# Patient Record
Sex: MALE | Race: BLACK OR AFRICAN AMERICAN | NOT HISPANIC OR LATINO | Employment: FULL TIME | URBAN - METROPOLITAN AREA
[De-identification: names, ages, dates, MRNs, and addresses within clinical notes are randomized per-mention and may not be internally consistent; named-entity substitution may affect disease eponyms.]

---

## 2017-03-03 ENCOUNTER — APPOINTMENT (OUTPATIENT)
Dept: URGENT CARE | Age: 47
End: 2017-03-03
Payer: OTHER MISCELLANEOUS

## 2017-03-03 PROCEDURE — 99283 EMERGENCY DEPT VISIT LOW MDM: CPT

## 2017-03-03 PROCEDURE — G0382 LEV 3 HOSP TYPE B ED VISIT: HCPCS

## 2017-03-07 ENCOUNTER — APPOINTMENT (OUTPATIENT)
Dept: OCCUPATIONAL MEDICINE | Facility: CLINIC | Age: 47
End: 2017-03-07
Payer: OTHER MISCELLANEOUS

## 2017-03-07 PROCEDURE — 99213 OFFICE O/P EST LOW 20 MIN: CPT

## 2017-03-16 ENCOUNTER — APPOINTMENT (OUTPATIENT)
Dept: OCCUPATIONAL MEDICINE | Facility: CLINIC | Age: 47
End: 2017-03-16
Payer: OTHER MISCELLANEOUS

## 2017-03-16 PROCEDURE — 99213 OFFICE O/P EST LOW 20 MIN: CPT

## 2018-09-27 ENCOUNTER — OFFICE VISIT (OUTPATIENT)
Dept: FAMILY MEDICINE CLINIC | Facility: CLINIC | Age: 48
End: 2018-09-27
Payer: COMMERCIAL

## 2018-09-27 VITALS
BODY MASS INDEX: 19.27 KG/M2 | RESPIRATION RATE: 16 BRPM | HEIGHT: 75 IN | TEMPERATURE: 97.1 F | SYSTOLIC BLOOD PRESSURE: 130 MMHG | WEIGHT: 155 LBS | OXYGEN SATURATION: 97 % | HEART RATE: 88 BPM | DIASTOLIC BLOOD PRESSURE: 90 MMHG

## 2018-09-27 DIAGNOSIS — S20.212D RIB CONTUSION, LEFT, SUBSEQUENT ENCOUNTER: Primary | ICD-10-CM

## 2018-09-27 PROCEDURE — 99204 OFFICE O/P NEW MOD 45 MIN: CPT | Performed by: PHYSICIAN ASSISTANT

## 2018-09-27 RX ORDER — METHOCARBAMOL 500 MG/1
500 TABLET, FILM COATED ORAL 2 TIMES DAILY PRN
Qty: 20 TABLET | Refills: 0 | Status: SHIPPED | OUTPATIENT
Start: 2018-09-27 | End: 2020-03-06 | Stop reason: SDUPTHER

## 2018-09-27 RX ORDER — IBUPROFEN 800 MG/1
800 TABLET ORAL EVERY 8 HOURS PRN
Qty: 60 TABLET | Refills: 0 | Status: SHIPPED | OUTPATIENT
Start: 2018-09-27 | End: 2020-03-06 | Stop reason: ALTCHOICE

## 2018-09-27 RX ORDER — METHOCARBAMOL 500 MG/1
500 TABLET, FILM COATED ORAL DAILY
COMMUNITY
Start: 2018-09-18 | End: 2018-09-27 | Stop reason: SDUPTHER
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
## 2018-09-27 NOTE — ASSESSMENT & PLAN NOTE
- will renew scripts for ibuprofen and robaxin   - referral to orthopedics, though I did inform patient that there are no rib fractures or known internal injuries

## 2019-10-31 ENCOUNTER — HOSPITAL ENCOUNTER (EMERGENCY)
Facility: HOSPITAL | Age: 49
Discharge: HOME | End: 2019-10-31
Attending: EMERGENCY MEDICINE | Admitting: EMERGENCY MEDICINE
Payer: MEDICARE

## 2019-10-31 VITALS
BODY MASS INDEX: 19.79 KG/M2 | RESPIRATION RATE: 18 BRPM | OXYGEN SATURATION: 94 % | WEIGHT: 158.3 LBS | HEART RATE: 81 BPM | TEMPERATURE: 98.5 F | DIASTOLIC BLOOD PRESSURE: 73 MMHG | SYSTOLIC BLOOD PRESSURE: 132 MMHG

## 2019-10-31 DIAGNOSIS — K08.89 PAIN, DENTAL: Primary | ICD-10-CM

## 2019-10-31 PROCEDURE — 99282 EMERGENCY DEPT VISIT SF MDM: CPT

## 2019-10-31 PROCEDURE — 96372 THER/PROPH/DIAG INJ SC/IM: CPT

## 2019-10-31 PROCEDURE — 99284 EMERGENCY DEPT VISIT MOD MDM: CPT | Performed by: PHYSICIAN ASSISTANT

## 2019-10-31 RX ORDER — IBUPROFEN 600 MG/1
600 TABLET ORAL EVERY 6 HOURS PRN
Qty: 30 TABLET | Refills: 0 | Status: SHIPPED | OUTPATIENT
Start: 2019-10-31 | End: 2020-03-06 | Stop reason: SDUPTHER

## 2019-10-31 RX ORDER — CLINDAMYCIN HYDROCHLORIDE 150 MG/1
300 CAPSULE ORAL ONCE
Status: COMPLETED | OUTPATIENT
Start: 2019-10-31 | End: 2019-10-31

## 2019-10-31 RX ORDER — KETOROLAC TROMETHAMINE 30 MG/ML
30 INJECTION, SOLUTION INTRAMUSCULAR; INTRAVENOUS ONCE
Status: COMPLETED | OUTPATIENT
Start: 2019-10-31 | End: 2019-10-31

## 2019-10-31 RX ORDER — CLINDAMYCIN HYDROCHLORIDE 300 MG/1
300 CAPSULE ORAL EVERY 6 HOURS SCHEDULED
Qty: 40 CAPSULE | Refills: 0 | Status: SHIPPED | OUTPATIENT
Start: 2019-10-31 | End: 2019-11-10

## 2019-10-31 RX ADMIN — CLINDAMYCIN HYDROCHLORIDE 300 MG: 150 CAPSULE ORAL at 10:12

## 2019-10-31 RX ADMIN — KETOROLAC TROMETHAMINE 30 MG: 30 INJECTION, SOLUTION INTRAMUSCULAR; INTRAVENOUS at 10:14

## 2019-10-31 NOTE — ED PROVIDER NOTES
History  Chief Complaint   Patient presents with    Dental Pain     bottom right side  pt refuses to answer any questions in triage "this if fucking stupid, I need a doctor NOW"     Pt with dental pain for 2 days  Pt denies any other problems       Medical Problem   Location:  Pt with right lower tooth pain for 2 days   Severity:  Moderate  Onset quality:  Gradual  Duration:  2 days  Timing:  Constant  Progression:  Unchanged  Chronicity:  New  Associated symptoms: no abdominal pain, no chest pain, no congestion, no cough, no diarrhea, no ear pain, no fatigue, no fever, no headaches, no loss of consciousness, no myalgias, no nausea, no rash, no rhinorrhea, no shortness of breath, no sore throat, no vomiting and no wheezing        Prior to Admission Medications   Prescriptions Last Dose Informant Patient Reported? Taking?   ibuprofen (MOTRIN) 800 mg tablet   No No   Sig: Take 1 tablet (800 mg total) by mouth every 8 (eight) hours as needed for mild pain   methocarbamol (ROBAXIN) 500 mg tablet   No No   Sig: Take 1 tablet (500 mg total) by mouth 2 (two) times a day as needed for muscle spasms      Facility-Administered Medications: None       History reviewed  No pertinent past medical history  History reviewed  No pertinent surgical history  History reviewed  No pertinent family history  I have reviewed and agree with the history as documented  Social History     Tobacco Use    Smoking status: Current Some Day Smoker    Smokeless tobacco: Current User   Substance Use Topics    Alcohol use: Not Currently    Drug use: Never        Review of Systems   Constitutional: Negative  Negative for fatigue and fever  HENT: Negative  Negative for congestion, ear pain, rhinorrhea and sore throat  Eyes: Negative  Respiratory: Negative  Negative for cough, shortness of breath and wheezing  Cardiovascular: Negative  Negative for chest pain  Gastrointestinal: Negative    Negative for abdominal pain, diarrhea, nausea and vomiting  Endocrine: Negative  Genitourinary: Negative  Musculoskeletal: Negative  Negative for myalgias  Skin: Negative  Negative for rash  Allergic/Immunologic: Negative  Neurological: Negative  Negative for loss of consciousness and headaches  Hematological: Negative  Psychiatric/Behavioral: Negative  All other systems reviewed and are negative  Physical Exam  Physical Exam   Constitutional: He is oriented to person, place, and time  He appears well-developed and well-nourished  HENT:   Head: Normocephalic and atraumatic  Right Ear: External ear normal    Left Ear: External ear normal    Nose: Nose normal    Mouth/Throat: Oropharynx is clear and moist    Right lower 2nd molar tender and gum pain no gum swelling  +jaw pain no jaw swelling  No neck pain  Floor of mouth wnl  Pharynx wnl    Eyes: Pupils are equal, round, and reactive to light  Conjunctivae and EOM are normal    Neck: Normal range of motion  Neck supple  Cardiovascular: Normal rate, regular rhythm and normal heart sounds  Pulmonary/Chest: Effort normal and breath sounds normal    Abdominal: Soft  Bowel sounds are normal    Musculoskeletal: Normal range of motion  Neurological: He is alert and oriented to person, place, and time  Skin: Skin is warm  Psychiatric: He has a normal mood and affect  His behavior is normal    Nursing note and vitals reviewed        Vital Signs  ED Triage Vitals [10/31/19 1002]   Temperature Pulse Respirations Blood Pressure SpO2   98 5 °F (36 9 °C) 81 18 132/73 94 %      Temp Source Heart Rate Source Patient Position - Orthostatic VS BP Location FiO2 (%)   Oral -- -- -- --      Pain Score       --           Vitals:    10/31/19 1002   BP: 132/73   Pulse: 81         Visual Acuity      ED Medications  Medications   ketorolac (TORADOL) injection 30 mg (30 mg Intramuscular Given 10/31/19 1014)   clindamycin (CLEOCIN) capsule 300 mg (300 mg Oral Given 10/31/19 1012) Diagnostic Studies  Results Reviewed     None                 No orders to display              Procedures  Procedures       ED Course                               MDM    Disposition  Final diagnoses:   Pain, dental     Time reflects when diagnosis was documented in both MDM as applicable and the Disposition within this note     Time User Action Codes Description Comment    10/31/2019 10:15 AM Gilberto Ricci  Zoya [K08 89] Pain, dental       ED Disposition     ED Disposition Condition Date/Time Comment    Discharge Stable Thu Oct 31, 2019 10:15 AM Carlita Anderson discharge to home/self care  Follow-up Information     Follow up With Specialties Details Why [address]    [facility] [address]          Discharge Medication List as of 10/31/2019 10:17 AM      START taking these medications    Details   clindamycin (CLEOCIN) 300 MG capsule Take 1 capsule (300 mg total) by mouth every 6 (six) hours for 10 days, Starting u 10/31/2019, Until Sun 11/10/2019, Print      !! ibuprofen (MOTRIN) 600 mg tablet Take 1 tablet (600 mg total) by mouth every 6 (six) hours as needed for moderate pain, Starting u 10/31/2019, Print       !! - Potential duplicate medications found  Please discuss with provider  CONTINUE these medications which have NOT CHANGED    Details   !! ibuprofen (MOTRIN) 800 mg tablet Take 1 tablet (800 mg total) by mouth every 8 (eight) hours as needed for mild pain, Starting u 9/27/2018, Print      methocarbamol (ROBAXIN) 500 mg tablet Take 1 tablet (500 mg total) by mouth 2 (two) times a day as needed for muscle spasms, Starting u 9/27/2018, Print       !! - Potential duplicate medications found  Please discuss with provider  No discharge procedures on file      ED Provider  Electronically Signed by           Jeffery George PA-C  10/31/19 7103

## 2020-01-14 ENCOUNTER — HOSPITAL ENCOUNTER (EMERGENCY)
Facility: HOSPITAL | Age: 50
Discharge: HOME | End: 2020-01-14
Attending: EMERGENCY MEDICINE
Payer: MEDICARE

## 2020-01-14 VITALS
RESPIRATION RATE: 16 BRPM | TEMPERATURE: 97.8 F | WEIGHT: 160.94 LBS | OXYGEN SATURATION: 100 % | SYSTOLIC BLOOD PRESSURE: 125 MMHG | DIASTOLIC BLOOD PRESSURE: 77 MMHG | BODY MASS INDEX: 20.12 KG/M2 | HEART RATE: 77 BPM

## 2020-01-14 DIAGNOSIS — M54.50 ACUTE EXACERBATION OF CHRONIC LOW BACK PAIN: Primary | ICD-10-CM

## 2020-01-14 DIAGNOSIS — G89.29 ACUTE EXACERBATION OF CHRONIC LOW BACK PAIN: Primary | ICD-10-CM

## 2020-01-14 PROCEDURE — 99284 EMERGENCY DEPT VISIT MOD MDM: CPT | Performed by: PHYSICIAN ASSISTANT

## 2020-01-14 PROCEDURE — 99283 EMERGENCY DEPT VISIT LOW MDM: CPT

## 2020-01-14 RX ORDER — METHOCARBAMOL 500 MG/1
500 TABLET, FILM COATED ORAL 2 TIMES DAILY
Qty: 40 TABLET | Refills: 0 | Status: SHIPPED | OUTPATIENT
Start: 2020-01-14 | End: 2020-03-06 | Stop reason: SDUPTHER

## 2020-01-14 RX ORDER — IBUPROFEN 800 MG/1
800 TABLET ORAL 3 TIMES DAILY
Qty: 60 TABLET | Refills: 0 | Status: SHIPPED | OUTPATIENT
Start: 2020-01-14 | End: 2020-03-06 | Stop reason: ALTCHOICE

## 2020-01-14 RX ORDER — METHOCARBAMOL 500 MG/1
500 TABLET, FILM COATED ORAL ONCE
Status: COMPLETED | OUTPATIENT
Start: 2020-01-14 | End: 2020-01-14

## 2020-01-14 RX ADMIN — METHOCARBAMOL 500 MG: 500 TABLET, FILM COATED ORAL at 09:39

## 2020-01-14 NOTE — ED PROVIDER NOTES
History  Chief Complaint   Patient presents with    Back Pain     lower back pain since last night  took motrin at about 0700 this morning and it did help with pain     Patient is a 28-year-old male who reports a past medical history of chronic low back pain  Patient presents today for evaluation of an exacerbation of this back pain after exercising yesterday  Patient reports he has had MRIs as well as imaging and previous physical therapy for his back pain  Patient reports he has been using Motrin as needed for the pain and reports significant relief with this medication however reports he is out of Motrin at this time  Patient denies any inciting traumatic injury for the back pain, rashes or skin changes to the area back pain, numbness, tingling, weakness, paresthesias, paralysis, bowel or bladder incontinence  Patient denies any fevers or chills or injections into the spine in the past   Patient reports exacerbation of bending or twisting  History provided by:  Patient   used: No    Back Pain   Location:  Lumbar spine  Quality:  Aching, cramping and shooting  Radiates to:  Does not radiate  Pain severity:  Moderate  Pain is:  Same all the time  Onset quality:  Gradual  Duration:  1 day  Timing:  Intermittent  Progression:  Waxing and waning  Chronicity:  Chronic  Context comment:  Recent exercise  Relieved by:  NSAIDs  Associated symptoms: no abdominal pain, no chest pain, no dysuria, no fever and no numbness        Prior to Admission Medications   Prescriptions Last Dose Informant Patient Reported?  Taking?   ibuprofen (MOTRIN) 600 mg tablet   No No   Sig: Take 1 tablet (600 mg total) by mouth every 6 (six) hours as needed for moderate pain   ibuprofen (MOTRIN) 800 mg tablet   No No   Sig: Take 1 tablet (800 mg total) by mouth every 8 (eight) hours as needed for mild pain   methocarbamol (ROBAXIN) 500 mg tablet   No No   Sig: Take 1 tablet (500 mg total) by mouth 2 (two) times a day as needed for muscle spasms      Facility-Administered Medications: None       Past Medical History:   Diagnosis Date    Chronic back pain        History reviewed  No pertinent surgical history  History reviewed  No pertinent family history  I have reviewed and agree with the history as documented  Social History     Tobacco Use    Smoking status: Current Some Day Smoker    Smokeless tobacco: Never Used    Tobacco comment: social   Substance Use Topics    Alcohol use: Yes     Comment: social    Drug use: Never        Review of Systems   Constitutional: Negative for chills, fatigue and fever  HENT: Negative for congestion, ear pain, rhinorrhea and sore throat  Eyes: Negative for redness  Respiratory: Negative for chest tightness and shortness of breath  Cardiovascular: Negative for chest pain and palpitations  Gastrointestinal: Negative for abdominal pain, nausea and vomiting  Genitourinary: Negative for dysuria and hematuria  Musculoskeletal: Positive for back pain  Skin: Negative for rash  Neurological: Negative for dizziness, syncope, light-headedness and numbness  Physical Exam  Physical Exam   Constitutional: He is oriented to person, place, and time  He appears well-developed and well-nourished  HENT:   Head: Normocephalic and atraumatic  Eyes: Pupils are equal, round, and reactive to light  Neck: Normal range of motion  Cardiovascular: Normal rate, regular rhythm and normal heart sounds  Pulmonary/Chest: Effort normal and breath sounds normal    Abdominal: Soft  There is no tenderness  There is no guarding  Musculoskeletal: He exhibits tenderness  Back:    No deformities, crepitus, step-off, skin changes noted to the area of pain  Lymphadenopathy:     He has no cervical adenopathy  Neurological: He is alert and oriented to person, place, and time  Skin: Skin is warm and dry  Capillary refill takes less than 2 seconds     Psychiatric: He has a normal mood and affect  Nursing note and vitals reviewed  Vital Signs  ED Triage Vitals [01/14/20 0913]   Temperature Pulse Respirations Blood Pressure SpO2   97 8 °F (36 6 °C) 77 16 125/77 100 %      Temp Source Heart Rate Source Patient Position - Orthostatic VS BP Location FiO2 (%)   Tympanic Monitor Sitting Left arm --      Pain Score       6           Vitals:    01/14/20 0913   BP: 125/77   Pulse: 77   Patient Position - Orthostatic VS: Sitting         Visual Acuity      ED Medications  Medications   methocarbamol (ROBAXIN) tablet 500 mg (500 mg Oral Given 1/14/20 5449)       Diagnostic Studies  Results Reviewed     None                 No orders to display              Procedures  Procedures         ED Course                               MDM  Number of Diagnoses or Management Options  Acute exacerbation of chronic low back pain:   Diagnosis management comments: Denies history of severe or worsening lower extremity weakness/numbness  Denies history of saddle anesthesia/perineal anesthesia  Denies bowel or bladder incontinence/retention   History does not suggest diagnosis of cauda equina syndrome   Patient denies history of IVDA, denies history of fevers, no recent surgeries or any procedures to suggest a transient bacteremia leading to a diagnosis of subdural abscess  Denies history of blood thinner use with recent history of lumbar puncture or any violation of the epidural space to suggest history of epidural hematoma  Therefore these above diagnoses (cauda equina syndrome, epidural abscess, epidural hematoma) were not pursued with diagnostic imaging    Patient declines imaging at this time, CT scan of the lumbar spine was offered however patient reports he would prefer to follow with a specialist and if the specialist requires imaging to obtain the imaging through the specialist         Disposition  Final diagnoses:   Acute exacerbation of chronic low back pain     Time reflects when diagnosis was documented in both MDM as applicable and the Disposition within this note     Time User Action Codes Description Comment    1/14/2020  9:38 AM Dontae Milligan Add [M54 5,  G89 29] Acute exacerbation of chronic low back pain       ED Disposition     ED Disposition Condition Date/Time Comment    Discharge Good Tue Jan 14, 2020  9:38 AM Shayy Luciano discharge to home/self care  Follow-up Information     Follow up With Specialties Details Why Contact Info Additional Information    [facility] Physical Therapy Schedule an appointment as soon as possible for a visit in 2 days  [phone] [address] [facility] [address], PA-C Psychiatry, Physician Assistant Schedule an appointment as soon as possible for a visit  As needed [address] [facility] [address]              Discharge Medication List as of 1/14/2020  9:52 AM      START taking these medications    Details   !! ibuprofen (MOTRIN) 800 mg tablet Take 1 tablet (800 mg total) by mouth 3 (three) times a day for 20 days, Starting Tue 1/14/2020, Until Mon 2/3/2020, Normal      !! methocarbamol (ROBAXIN) 500 mg tablet Take 1 tablet (500 mg total) by mouth 2 (two) times a day for 20 days, Starting Tue 1/14/2020, Until Mon 2/3/2020, Normal       !! - Potential duplicate medications found  Please discuss with provider  CONTINUE these medications which have NOT CHANGED    Details   !! ibuprofen (MOTRIN) 600 mg tablet Take 1 tablet (600 mg total) by mouth every 6 (six) hours as needed for moderate pain, Starting Thu 10/31/2019, Print      !! ibuprofen (MOTRIN) 800 mg tablet Take 1 tablet (800 mg total) by mouth every 8 (eight) hours as needed for mild pain, Starting Thu 9/27/2018, Print      !! methocarbamol (ROBAXIN) 500 mg tablet Take 1 tablet (500 mg total) by mouth 2 (two) times a day as needed for muscle spasms, Starting Thu 9/27/2018, Print       !! - Potential duplicate medications found   Please discuss with provider              ED Provider  Electronically Signed by           Peyton Kruse PA-C  01/14/20 1140

## 2020-01-16 ENCOUNTER — NURSE TRIAGE (OUTPATIENT)
Dept: PHYSICAL THERAPY | Facility: OTHER | Age: 50
End: 2020-01-16

## 2020-01-16 DIAGNOSIS — G89.29 ACUTE EXACERBATION OF CHRONIC LOW BACK PAIN: Primary | ICD-10-CM

## 2020-01-16 DIAGNOSIS — M54.50 ACUTE EXACERBATION OF CHRONIC LOW BACK PAIN: Primary | ICD-10-CM

## 2020-02-11 ENCOUNTER — HOSPITAL ENCOUNTER (EMERGENCY)
Facility: HOSPITAL | Age: 50
Discharge: HOME | End: 2020-02-11
Attending: EMERGENCY MEDICINE
Payer: MEDICARE

## 2020-02-11 VITALS
HEART RATE: 69 BPM | DIASTOLIC BLOOD PRESSURE: 83 MMHG | RESPIRATION RATE: 16 BRPM | SYSTOLIC BLOOD PRESSURE: 129 MMHG | OXYGEN SATURATION: 99 % | TEMPERATURE: 98 F

## 2020-02-11 DIAGNOSIS — G89.29 ACUTE EXACERBATION OF CHRONIC LOW BACK PAIN: Primary | ICD-10-CM

## 2020-02-11 DIAGNOSIS — M54.50 ACUTE EXACERBATION OF CHRONIC LOW BACK PAIN: Primary | ICD-10-CM

## 2020-02-11 PROCEDURE — 99283 EMERGENCY DEPT VISIT LOW MDM: CPT | Performed by: EMERGENCY MEDICINE

## 2020-02-11 PROCEDURE — 99283 EMERGENCY DEPT VISIT LOW MDM: CPT

## 2020-02-11 RX ORDER — NAPROXEN 500 MG/1
500 TABLET ORAL 2 TIMES DAILY WITH MEALS
Qty: 12 TABLET | Refills: 0 | Status: SHIPPED | OUTPATIENT
Start: 2020-02-11 | End: 2020-03-06 | Stop reason: ALTCHOICE

## 2020-02-11 RX ORDER — METHOCARBAMOL 500 MG/1
500 TABLET, FILM COATED ORAL 3 TIMES DAILY
Qty: 12 TABLET | Refills: 0 | Status: SHIPPED | OUTPATIENT
Start: 2020-02-11 | End: 2020-03-06 | Stop reason: SDUPTHER

## 2020-02-11 RX ORDER — LIDOCAINE 50 MG/G
1 PATCH TOPICAL ONCE
Status: DISCONTINUED | OUTPATIENT
Start: 2020-02-11 | End: 2020-02-11 | Stop reason: HOSPADM

## 2020-02-11 RX ADMIN — LIDOCAINE 1 PATCH: 50 PATCH TOPICAL at 09:36

## 2020-02-11 NOTE — ED PROVIDER NOTES
History  Chief Complaint   Patient presents with    Back Pain     Chronic low back pain  Patient had MRI and diagnosed with herniated disc  Patient states they wanted to perform surgery but states he does not want surgery  New insurance doesn't start until March so he is unable to see spine specialist until then     Patient is a 43-year-old male with past medical history of chronic back pain who presents for evaluation of acute on chronic back pain  He states that he has had chronic back pain for at least 2 years  He states he has had MRIs in the past which show herniated discs  He states that he has done physical therapy in the past and that this has helped  He states pain comes and goes  He states that it is typical pain for him and located across the lower back worse on the left and can radiate into his left leg  He states movement makes worse  He admits he was seen at [facility] for similar recently  He was given anti-inflammatories and muscle relaxers which he states did help however he ran out of these medications  He was also referred to the comprehensive spine program which he plans to see however he needs his insurance to kick in  He states his insurance does not start until March  He denies any new injury or trauma  He denies any new symptoms  Denies fever, chills, nausea vomiting diarrhea, chest pain, shortness breath, abdominal pain, bladder or bowel dysfunction, saddle anesthesia, numbness or weakness, dysuria, hematuria, frequency, history of IV drug use, recent illnesses, history of cancer  Prior to Admission Medications   Prescriptions Last Dose Informant Patient Reported?  Taking?   ibuprofen (MOTRIN) 600 mg tablet   No No   Sig: Take 1 tablet (600 mg total) by mouth every 6 (six) hours as needed for moderate pain   ibuprofen (MOTRIN) 800 mg tablet   No No   Sig: Take 1 tablet (800 mg total) by mouth every 8 (eight) hours as needed for mild pain   ibuprofen (MOTRIN) 800 mg tablet   No No   Sig: Take 1 tablet (800 mg total) by mouth 3 (three) times a day for 20 days   methocarbamol (ROBAXIN) 500 mg tablet   No No   Sig: Take 1 tablet (500 mg total) by mouth 2 (two) times a day as needed for muscle spasms   methocarbamol (ROBAXIN) 500 mg tablet   No No   Sig: Take 1 tablet (500 mg total) by mouth 2 (two) times a day for 20 days      Facility-Administered Medications: None       Past Medical History:   Diagnosis Date    Chronic back pain        History reviewed  No pertinent surgical history  History reviewed  No pertinent family history  I have reviewed and agree with the history as documented  Social History     Tobacco Use    Smoking status: Current Some Day Smoker    Smokeless tobacco: Never Used    Tobacco comment: social   Substance Use Topics    Alcohol use: Yes     Comment: social    Drug use: Never       Review of Systems   Constitutional: Negative for chills and fever  Respiratory: Negative for shortness of breath  Cardiovascular: Negative for chest pain  Gastrointestinal: Negative for abdominal pain, diarrhea, nausea and vomiting  Genitourinary: Negative for decreased urine volume, dysuria, frequency and hematuria  Musculoskeletal: Positive for back pain  Skin: Negative for rash  Neurological: Negative for weakness and numbness  All other systems reviewed and are negative  Physical Exam  Physical Exam   Constitutional: Vital signs are normal  He appears well-developed and well-nourished  He is active  Non-toxic appearance  No distress  HENT:   Head: Normocephalic and atraumatic  Right Ear: External ear normal    Left Ear: External ear normal    Nose: Nose normal    Eyes: EOM are normal    Neck: Normal range of motion  Cardiovascular: Normal rate, regular rhythm and normal heart sounds  Exam reveals no gallop and no friction rub  No murmur heard  Pulmonary/Chest: Effort normal and breath sounds normal  No stridor   No respiratory distress  He has no wheezes  Abdominal: Soft  Bowel sounds are normal  He exhibits no distension  There is no tenderness  There is no guarding  Musculoskeletal:        Arms:  Diffuse tenderness to palpation over the lower back and lumbar spine  No point tenderness  No deformity or step off  No erythema, ecchymosis, swelling, rash, abrasion  NVI distally  Great toe extension strength intact bilaterally  Pedal pulses intact  Neurological: He is alert  Skin: Skin is warm  He is not diaphoretic  Psychiatric: He has a normal mood and affect  His behavior is normal    Nursing note and vitals reviewed  Vital Signs  ED Triage Vitals [02/11/20 0840]   Temperature Pulse Respirations Blood Pressure SpO2   98 °F (36 7 °C) 69 16 129/83 99 %      Temp Source Heart Rate Source Patient Position - Orthostatic VS BP Location FiO2 (%)   Oral Monitor Sitting Right arm --      Pain Score       Worst Possible Pain           Vitals:    02/11/20 0840   BP: 129/83   Pulse: 69   Patient Position - Orthostatic VS: Sitting         Visual Acuity      ED Medications  Medications - No data to display    Diagnostic Studies  Results Reviewed     None                 No orders to display              Procedures  Procedures         ED Course                               MDM  Number of Diagnoses or Management Options  Acute exacerbation of chronic low back pain:   Diagnosis management comments: Patient with acute on chronic low back pain  He states he has had similar back pain over the last 2 years but does not currently have insurance and therefore does not have a family doctor/specialist   He states he has had MRIs in the past- nothing recently  He declines further imaging here today and prefers to follow-up with the comprehensive spine program which he was previously referred to  He was previously on naproxen and Robaxin which helped however he ran out of this medicine  No new injury or trauma  No red flag signs or symptoms  Will give prescription for naproxen and Robaxin  Lidoderm patch applied here in the ED  Advised to remove in 10-12 hours  Instructed to follow-up with the comprehensive spine program and the [facility]  Return to the ED if symptoms worsen or new symptoms arise  Patient states understanding and agrees with plan  Patient Progress  Patient progress: stable        Disposition  Final diagnoses:   Acute exacerbation of chronic low back pain     Time reflects when diagnosis was documented in both MDM as applicable and the Disposition within this note     Time User Action Codes Description Comment    2/11/2020  9:26 AM Cain Love Add [M54 5,  G89 29] Acute exacerbation of chronic low back pain       ED Disposition     ED Disposition Condition Date/Time Comment    Discharge Stable Tue Feb 11, 2020  9:26 AM Renata Gallardo discharge to home/self care              Follow-up Information     Follow up With Specialties Details Why Contact Info Angelika Blair PA-C Psychiatry, Physician Assistant Schedule an appointment as soon as possible for a visit in 1 day  [address] [place] [address]  [phone]       [facility] Physical Therapy  Requires follow up with spine specialist     [facility] Emergency Department Emergency Medicine  If symptoms worsen [facility] [id number]  [phone] [place] ED, [address], [place], [address]          Discharge Medication List as of 2/11/2020  9:29 AM      START taking these medications    Details   naproxen (EC NAPROSYN) 500 MG EC tablet Take 1 tablet (500 mg total) by mouth 2 (two) times a day with meals, Starting Tue 2/11/2020, Normal         CONTINUE these medications which have CHANGED    Details   !! methocarbamol (ROBAXIN) 500 mg tablet Take 1 tablet (500 mg total) by mouth 3 (three) times a day, Starting Tue 2/11/2020, Normal       !! - Potential duplicate medications found  Please discuss with provider  CONTINUE these medications which have NOT CHANGED    Details   !! ibuprofen (MOTRIN) 600 mg tablet Take 1 tablet (600 mg total) by mouth every 6 (six) hours as needed for moderate pain, Starting u 10/31/2019, Print      !! ibuprofen (MOTRIN) 800 mg tablet Take 1 tablet (800 mg total) by mouth every 8 (eight) hours as needed for mild pain, Starting u 9/27/2018, Print      !! methocarbamol (ROBAXIN) 500 mg tablet Take 1 tablet (500 mg total) by mouth 2 (two) times a day as needed for muscle spasms, Starting u 9/27/2018, Print       !! - Potential duplicate medications found  Please discuss with provider  No discharge procedures on file      PDMP Review     None          ED Provider  Electronically Signed by           Cristhian Avalos PA-C  02/11/20 5462

## 2020-03-03 NOTE — PROGRESS NOTES
Assessment/Plan:      Diagnoses and all orders for this visit:    Fall (on) (from) other stairs and steps, sequela  -     XR spine lumbar 2 or 3 views injury; Future    Acute exacerbation of chronic low back pain  -     Ambulatory referral to Physical Medicine Rehab  -     XR spine lumbar 2 or 3 views injury; Future  -     Discontinue: ketorolac (TORADOL) injection 30 mg  -     predniSONE 10 mg tablet; Take 6 tablets by mouth on day 1, 5 tablets day 2, 4 tablets day 3, 3 tablets day 4, 2 tablets day 5, and 1 tablet on day 6   -     methocarbamol (ROBAXIN) 500 mg tablet; Take 1 tablet (500 mg total) by mouth 3 (three) times a day  -     ibuprofen (MOTRIN) 600 mg tablet; Take 1 tablet (600 mg total) by mouth every 6 (six) hours as needed for moderate pain  -     Ambulatory referral to Physical Therapy; Future    Lumbar radiculopathy  -     MRI lumbar spine wo contrast; Future  -     predniSONE 10 mg tablet; Take 6 tablets by mouth on day 1, 5 tablets day 2, 4 tablets day 3, 3 tablets day 4, 2 tablets day 5, and 1 tablet on day 6   -     ibuprofen (MOTRIN) 600 mg tablet; Take 1 tablet (600 mg total) by mouth every 6 (six) hours as needed for moderate pain  -     Ambulatory referral to Physical Therapy; Future    Abnormal neurological exam  -     MRI lumbar spine wo contrast; Future    Other orders  -     Discontinue: ibuprofen (MOTRIN) 600 mg tablet; Take 1 tablet (600 mg total) by mouth every 6 (six) hours as needed for moderate pain      DISCUSSION:  52-year-old male presenting for acute exacerbation of chronic low back pain secondary to fall in steps approximately 2-1/2 months ago  He denies any recent plain films to rule out fracture we will have him complete x-rays of lumbar spine at this time    As he does admit to left inner thigh and groin numbness as well as presents with sensory and motor deficits in the left lower extremity I do feel it appropriate to proceed with updated MRI of the lumbar spine at this time as cauda equina but more likely radiculopathy cannot be ruled out  He was advised that someone from this office will call to review results of x-ray imaging  He would like to restart physical therapy for pain and symptom relief and referral was placed in chart today to be completed when able  In the meantime for pain relief I will order prednisone taper  I discussed with the patient that at this point in time since I feel that there is a significant inflammatory component to their pain symptoms, that they would benefit from a titrating dose of oral steroids over the next 6 days  I advised the patient that while on the steroids, they should not take any other oral NSAIDs except for acetaminophen or Tylenol until they have completed the steroid taper  I also advised them that once they have completed the steroid taper, they are to give our office a follow-up phone call to let us know how they were doing and if there are any signs of improvement  The patient was agreeable and verbalized an understanding  After completion of prednisone taper he may continue utilizing ibuprofen and methocarbamol as he states these medications were providing relief when he received an from the emergency room  Subjective:     Patient ID: Mayela Soarse is a 51 y o  male  HPI referral from [facility] program for acute exacerbation of chronic back pain  Was evaluated in the ED for these issues on 2 separate occasions  Pain is exacerbated in mid December after falling on steps outside his home  He denies hitting head or loss of consciousness  No plain films were completed to rule out fracture  He does admit to a history of chronic back pain with known lumbar degenerative changes  He was offered surgical intervention years ago through [facility] but declined  He did complete physical therapy at that time with benefit  He denies any leg pain or radicular symptoms at that time    Radicular symptoms are new since fall in December  Pain today axial low back with radiation into left lower extremity with associated numbness and tingling  He rates his pain as an 8 or 9/10 on pain scale today  He denies any bowel or bladder changes but does admit to some left inner thigh and groin numbness  He admits to perceived weakness since fall  Pain improves with sitting and lying supine but exacerbating with standing and walking  He has needed to use cane since December  He was previously taking ibuprofen and methocarbamol for pain and symptom relief with benefit however he ran out of this medication  He has not been using any medications at present for this issue  His ADLs are significantly limited  Patient states he is barely able to ambulate  He has spent most of the past week in bed  He has missed work secondary to his back and leg pain  He does work in a factory  PAST MEDICAL HISTORY  Past Medical History:   Diagnosis Date    Chronic back pain      PAST SURGICAL HISTORY  History reviewed  No pertinent surgical history  FAMILY HISTORY  History reviewed  No pertinent family history  HOME MEDICATIONS  Current Outpatient Medications   Medication Sig Dispense Refill    ibuprofen (MOTRIN) 600 mg tablet Take 1 tablet (600 mg total) by mouth every 6 (six) hours as needed for moderate pain 60 tablet 1    methocarbamol (ROBAXIN) 500 mg tablet Take 1 tablet (500 mg total) by mouth 3 (three) times a day 30 tablet 1    predniSONE 10 mg tablet Take 6 tablets by mouth on day 1, 5 tablets day 2, 4 tablets day 3, 3 tablets day 4, 2 tablets day 5, and 1 tablet on day 6  21 tablet 0     No current facility-administered medications for this visit  ALLERGIES  Patient has no known allergies        SOCIAL HISTORY  Social History     Substance and Sexual Activity   Alcohol Use Yes    Comment: social     Social History     Substance and Sexual Activity   Drug Use Never     Social History     Tobacco Use   Smoking Status Current Some Day Smoker   Smokeless Tobacco Never Used   Tobacco Comment    social       Review of Systems   Constitutional: Positive for activity change  Negative for chills, diaphoresis, fatigue, fever and unexpected weight change  HENT: Negative for trouble swallowing  Eyes: Negative for visual disturbance  Respiratory: Negative for shortness of breath  Cardiovascular: Negative for chest pain and leg swelling  Gastrointestinal: Negative for constipation, diarrhea, nausea and vomiting  Endocrine: Negative for polydipsia, polyphagia and polyuria  Genitourinary: Negative for decreased urine volume, difficulty urinating and urgency  Musculoskeletal: Positive for back pain and gait problem  Negative for arthralgias, joint swelling and myalgias  Skin: Negative for color change, pallor and rash  Allergic/Immunologic: Negative for immunocompromised state  Neurological: Positive for weakness and numbness  Negative for dizziness, light-headedness and headaches  Hematological: Does not bruise/bleed easily  Psychiatric/Behavioral: Positive for sleep disturbance  Objective:  Vitals:    03/06/20 0808   BP: 130/86   Pulse: 68        Physical Exam   Constitutional: He is oriented to person, place, and time  He appears well-developed and well-nourished  Distressed: Visibly uncomfortable leaning against exam table today  HENT:   Head: Normocephalic and atraumatic  Eyes: Pupils are equal, round, and reactive to light  Conjunctivae are normal    Neck: Neck supple  Cardiovascular: Intact distal pulses  Pulmonary/Chest: Effort normal  No respiratory distress  Musculoskeletal:        Lumbar back: He exhibits decreased range of motion, tenderness, bony tenderness (Midline L3 through L5  ) and pain  He exhibits no swelling, no edema, no deformity and no spasm  Neurological: He is alert and oriented to person, place, and time  A sensory deficit (hypoesthesia lefy L5-S1) is present   No cranial nerve deficit  He exhibits normal muscle tone  Gait (utilizing SPC antalgic ) abnormal  Coordination normal    Reflex Scores:       Patellar reflexes are 2+ on the right side and 2+ on the left side  Achilles reflexes are 2+ on the right side and 2+ on the left side   + SLR left, - right     Left great toe dorsiflexion 3/5, right 5/5  Left foot dorsiflexion 4-/5, right 5/5  Left plantar flexion foot and great toe 5/5, right 5/5   Skin: Skin is warm and dry  No rash noted  He is not diaphoretic  No pallor  Psychiatric: He has a normal mood and affect  His behavior is normal  Judgment and thought content normal    Vitals reviewed

## 2020-03-06 ENCOUNTER — OFFICE VISIT (OUTPATIENT)
Dept: PAIN MEDICINE | Facility: CLINIC | Age: 50
End: 2020-03-06
Payer: COMMERCIAL

## 2020-03-06 ENCOUNTER — HOSPITAL ENCOUNTER (OUTPATIENT)
Dept: RADIOLOGY | Facility: HOSPITAL | Age: 50
Discharge: HOME | End: 2020-03-06
Payer: COMMERCIAL

## 2020-03-06 VITALS
SYSTOLIC BLOOD PRESSURE: 130 MMHG | HEART RATE: 68 BPM | HEIGHT: 75 IN | DIASTOLIC BLOOD PRESSURE: 86 MMHG | WEIGHT: 160 LBS | BODY MASS INDEX: 19.89 KG/M2

## 2020-03-06 DIAGNOSIS — M54.50 ACUTE EXACERBATION OF CHRONIC LOW BACK PAIN: ICD-10-CM

## 2020-03-06 DIAGNOSIS — M54.16 LUMBAR RADICULOPATHY: ICD-10-CM

## 2020-03-06 DIAGNOSIS — G89.29 ACUTE EXACERBATION OF CHRONIC LOW BACK PAIN: ICD-10-CM

## 2020-03-06 DIAGNOSIS — R29.90 ABNORMAL NEUROLOGICAL EXAM: ICD-10-CM
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
## 2020-03-06 PROCEDURE — 72100 X-RAY EXAM L-S SPINE 2/3 VWS: CPT

## 2020-03-06 PROCEDURE — 99204 OFFICE O/P NEW MOD 45 MIN: CPT | Performed by: PHYSICIAN ASSISTANT

## 2020-03-06 PROCEDURE — 3008F BODY MASS INDEX DOCD: CPT | Performed by: PHYSICIAN ASSISTANT

## 2020-03-06 RX ORDER — IBUPROFEN 600 MG/1
600 TABLET ORAL EVERY 6 HOURS PRN
Qty: 60 TABLET | Refills: 1 | Status: SHIPPED | OUTPATIENT
Start: 2020-03-06 | End: 2020-03-06

## 2020-03-06 RX ORDER — METHOCARBAMOL 500 MG/1
500 TABLET, FILM COATED ORAL 3 TIMES DAILY
Qty: 30 TABLET | Refills: 1 | Status: SHIPPED | OUTPATIENT
Start: 2020-03-06 | End: 2020-05-27 | Stop reason: ALTCHOICE

## 2020-03-06 RX ORDER — PREDNISONE 10 MG/1
TABLET ORAL
Qty: 21 TABLET | Refills: 0 | Status: SHIPPED | OUTPATIENT
Start: 2020-03-06 | End: 2020-05-27 | Stop reason: ALTCHOICE

## 2020-03-06 RX ORDER — IBUPROFEN 600 MG/1
600 TABLET ORAL EVERY 6 HOURS PRN
Qty: 60 TABLET | Refills: 1 | Status: SHIPPED | OUTPATIENT
Start: 2020-03-06 | End: 2020-05-27 | Stop reason: SDUPTHER

## 2020-03-06 RX ORDER — KETOROLAC TROMETHAMINE 30 MG/ML
30 INJECTION, SOLUTION INTRAMUSCULAR; INTRAVENOUS ONCE
Status: DISCONTINUED | OUTPATIENT
Start: 2020-03-06 | End: 2020-03-06

## 2020-03-06 NOTE — PATIENT INSTRUCTIONS
1  COMPLETE XRAY TODAY IN HOSPITAL TO RULE OUT FRACTURE; WILL CALL WITH RESULTS  2  START PHYSICAL THERAPY WHEN ABLE BY CALLING PHONE NUMBER ON PURPLE SHEET  3  CALL CENTRAL SCHEDULING TO SET UP MRI  4  TAKE MEDICATIONS AS PRESCRIBED; PREDNISONE FOR NEXT 6 DAYS AND THEN MAY RESTART IBUPROFEN AFTER COMPLETION OF PREDNISONE  CAN TAKE MUSCLE RELAXER WHILE TAKING PREDNISONE AND WITH IBUPROFEN   5  FOLLOW UP IN 6 WEEKS TO REVIEW IMAGING AND RE-EVALUATION      Lumbar Radiculopathy   WHAT YOU NEED TO KNOW:   What is lumbar radiculopathy? Lumbar radiculopathy is a painful condition that happens when a nerve in your lumbar spine (lower back) is pinched or irritated  Nerves control feeling and movement in your body  What causes lumbar radiculopathy? You may get a pinched nerve in your lumbar spine if you have disc damage  Discs are natural, spongy cushions between your vertebrae (back bones) that allow your spine to move  Your discs may move out of place and pinch the nerve in your spine  Your risk for a pinched nerve and lumbar radiculopathy increases if:  · You smoke  · You have diabetes, a spinal infection, or a growth in your spine  · You are overweight  · You are male  · You are elderly  What are the signs and symptoms of lumbar radiculopathy? You may have any of the following:  · Pain that moves from your lower back to your buttocks, groin, and the back of your leg  The pain is often felt below your knee  Your pain may worsen when you cough, sneeze, stand, or sit  · Numbness, weakness, or tingling in your back or legs  How is lumbar radiculopathy diagnosed? Your healthcare provider will examine you and ask about your family history of back and leg pain  He may also test you for weakness, numbness, or tingling in your back, buttocks, and legs  Your healthcare provider may ask you to lie on your back and lift your leg to locate your pain  You may have any of the following:  · Blood tests:   You may need blood taken to give caregivers information about how your body is working  The blood may be taken from your hand, arm, or IV  · Magnetic resonance imaging (MRI): An MRI machine is used to take a picture of your lower back  Your healthcare provider will use this picture to check for problems and changes in your back bones, nerves, and discs  You will need to lie still during this test  The MRI machine contains a very powerful magnet  Never enter the MRI room with any metal objects  This can cause serious injury  Tell your healthcare provider if you have any metal implants in your body  · X-ray: An x-ray is a picture of your lower back  A lumbar x-ray may show signs of infection or other problems with your spine  · An electromyography (EMG)  test measures the electrical activity of your muscles at rest and with movement  · Computed tomography (CT) scan: A special x-ray machine uses a computer to take pictures of your lower back  It may be used to look at your bones, discs, and nerves  You may be given dye in your IV to help improve the pictures  Tell your healthcare provider if you are allergic to shellfish, or have other allergies or medical conditions  People who are allergic to iodine or shellfish (lobster, crab, or shrimp) may be allergic to some dyes  How is lumbar radiculopathy treated? Treatment of lumbar radiculopathy may reduce pain and swelling, and improve your ability to walk and do your normal activities  Ask your healthcare provider for more information about these and other treatments for lumbar radiculopathy:  · Medicines:     ¨ NSAIDs , such as ibuprofen, help decrease swelling, pain, and fever  This medicine is available with or without a doctor's order  NSAIDs can cause stomach bleeding or kidney problems in certain people  If you take blood thinner medicine, always ask your healthcare provider if NSAIDs are safe for you   Always read the medicine label and follow directions  ¨ Muscle relaxers  help decrease pain and muscle spasms  ¨ Opioids: This is a strong medicine given to reduce severe pain  It is also called narcotic pain medicine  Take this medicine exactly as directed by your healthcare provider  ¨ Oral steroids: Steroids may be given to reduce swelling and pain  ¨ Steroid injections: Healthcare providers may give you steroid medicine through a needle (shot) into your lumbar spine  This may help decrease your nerve pain and swelling  You may need more than 1 injection if your symptoms do not improve after the first treatment  · Physical therapy: Your healthcare provider may suggest physical therapy  Your physical therapist may teach you certain exercises to improve posture (the way you stand and sit), flexibility, and strength in your lower back  He may also teach you how to remain safely active and avoid further injury  Follow the exercise plan given to you by your physical therapist     · Transcutaneous electrical nerve stimulation: This treatment, called TENS, stimulates your nerves and may decrease your pain  Wires are attached to pads  The pads are attached to your skin  The wires send a mild current through your nerves  · Surgery: You may need surgery to relieve your pinched nerve if your condition has not improved within 4 to 6 weeks  You may also need it if you have lumbar radiculopathy more than once  What are the risks of treatment for lumbar radiculopathy? · Without treatment, your pain may worsen  The pinched and swollen nerve may lead to problems when you walk or move  In severe cases, you may lose control of your urine or bowel movements  Bedrest can make your symptoms worse  · Pain medicines can cause vomiting, upset stomach, constipation (large, hard bowel movements that are difficult to pass), or kidney or liver problems  Opioid medicine may be addictive (hard to quit taking once you start)   Oral steroids and steroid injections may have side effects, such as facial redness, fluid retention (water weight gain), and mood changes  Steroid injections may be painful and can cause severe headaches, infections, allergic reactions, or nerve damage  Surgery risks include delayed problems with healing, spinal or bladder infection, damage to the spinal cord or other nerves, and ongoing pain  In rare cases, you could become paralyzed (not able to move your arms or legs)  How can I care for myself when I have lumbar radiculopathy? · Stay active: It is best to be active when you have lumbar radiculopathy  Your healthcare provider may tell you to take walks to ease yourself back into your daily routine  Avoid long periods of bed rest  Bed rest could worsen your symptoms  Do not move in ways that increase your pain  Ask your healthcare provider for more information about the best ways to stay active  · Use ice or heat packs:  Use ice or heat packs on the sore area of your body to decrease the pain and swelling  Put ice in a plastic bag covered with a towel on your low back  Cover heated items with a towel to avoid burns  Use ice and heat as directed  · Avoid heavy lifting: Your condition may worsen if you lift heavy things  Avoid lifting if possible  · Maintain a healthy weight:  Excess body weight may strain your back  Talk with your healthcare provider about ways to lose excess weight if you are overweight  When should I contact my healthcare provider? · Your pain does not improve within 1 to 3 weeks after treatment  · Your pain and weakness keep you from your normal activities at work, home, or school  · You lose more than 10 pounds in 6 months without trying  · You become depressed or sad because of the pain  · You have questions or concerns about your condition or care  When should I seek immediate care or call 911? · You have a fever greater than 100 4°F for longer than 2 days      · You have new, severe back or leg pain, or your pain spreads to both legs  · You have any new signs of numbness or weakness, especially in your lower back, legs, arms, or genital area  · You have new trouble controlling your urine and bowel movements  · You do not feel like your bladder empties when you urinate  CARE AGREEMENT:   You have the right to help plan your care  Learn about your health condition and how it may be treated  Discuss treatment options with your caregivers to decide what care you want to receive  You always have the right to refuse treatment  The above information is an  only  It is not intended as medical advice for individual conditions or treatments  Talk to your doctor, nurse or pharmacist before following any medical regimen to see if it is safe and effective for you  © 2017 [address] Information is for End User's use only and may not be sold, redistributed or otherwise used for commercial purposes  All illustrations and images included in CareNotes® are the copyrighted property of [employer]  or [employer]  Prednisone (By mouth)   Prednisone (PRED-ni-sone)  Treats many diseases and conditions, especially problems related to inflammation  This medicine is a corticosteroid  Brand Name(s): Contrast Allergy PreMed Pack, Amy, predniSONE Intensol   There may be other brand names for this medicine  When This Medicine Should Not Be Used: This medicine is not right for everyone  Do not use if you had an allergic reaction to prednisone or if you are pregnant  How to Use This Medicine:   Liquid, Tablet, Delayed Release Tablet  · Take your medicine as directed  Your dose may need to be changed several times to find what works best for you  · It is best to take this medicine with food or milk  · Swallow the delayed-release tablet whole  Do not crush, break, or chew it    · Measure the oral liquid medicine with a marked measuring spoon, oral syringe, or medicine cup  · Missed dose: Take a dose as soon as you remember  If it is almost time for your next dose, wait until then and take a regular dose  Do not take extra medicine to make up for a missed dose  · Store the medicine in a closed container at room temperature, away from heat, moisture, and direct light  Do not freeze the oral liquid  Drugs and Foods to Avoid:   Ask your doctor or pharmacist before using any other medicine, including over-the-counter medicines, vitamins, and herbal products  · Tell your doctor if you use any of the following:  ¨ Aminoglutethimide, amphotericin B, carbamazepine, cholestyramine, cyclosporine, digoxin, isoniazid, ketoconazole, phenobarbital, phenytoin, or rifampin  ¨ Blood thinner, such as warfarin  ¨ NSAID pain or arthritis medicine, such as aspirin, diclofenac, ibuprofen, naproxen, celecoxib  ¨ Diuretic (water pill)  ¨ Diabetes medicine  ¨ Macrolide antibiotic, such as azithromycin, clarithromycin, erythromycin  ¨ Estrogen, including birth control pills or hormone replacement therapy  · This medicine may interfere with vaccines  Ask your doctor before you get a flu shot or any other vaccines  Warnings While Using This Medicine:   · It is not safe to take this medicine during pregnancy  It could harm an unborn baby  Tell your doctor right away if you become pregnant  · Tell your doctor if you are breastfeeding or if you have kidney problems, heart failure, high blood pressure, a recent heart attack, diabetes, glaucoma, osteoporosis, or thyroid problems  Tell your doctor about any infection you have  Also tell your doctor if you have had mental or emotional problems (such as depression) or stomach or bowel problems (such as an ulcer or diverticulitis)    · This medicine may cause the following problems:  ¨ Mood or behavior changes  ¨ Higher blood pressure, retaining water, changes in salt or potassium levels in your body  ¨ Cataracts or glaucoma (with long-term use)  ¨ Weak bones or osteoporosis (with long-term use)  ¨ Slow growth in children (with long-term use)  ¨ Muscle problems (with high doses, especially if you have myasthenia gravis or similar nerve and muscle problems)  · Do not stop using this medicine suddenly  Your doctor will need to slowly decrease your dose before you stop it completely  · This medicine could cause you to get infections more easily  Tell your doctor right away if you are exposed to chicken pox, measles, or other serious infection  Tell your doctor if you had a serious infection in the past, such as tuberculosis or herpes  · Tell your doctor about any extra stress or anxiety in your life  Your dose might need to be changed for a short time  · Tell any doctor or dentist who treats you that you are using this medicine  This medicine may affect certain medical test results  · Keep all medicine out of the reach of children  Never share your medicine with anyone  Possible Side Effects While Using This Medicine:   Call your doctor right away if you notice any of these side effects:  · Allergic reaction: Itching or hives, swelling in your face or hands, swelling or tingling in your mouth or throat, chest tightness, trouble breathing  · Dark freckles, skin color changes, coldness, weakness, tiredness, nausea, vomiting, weight loss  · Depression, unusual thoughts, feelings, or behaviors, trouble sleeping  · Fever, chills, cough, sore throat, and body aches  · Muscle pain or weakness  · Rapid weight gain, swelling in your hands, ankles, or feet  · Severe stomach pain, nausea, vomiting, or red or black stools  · Skin changes or growths  · Trouble seeing, eye pain, headache  If you notice these less serious side effects, talk with your doctor:   · Increased appetite  · Round, puffy face  · Weight gain around your neck, upper back, breast, face, or waist  If you notice other side effects that you think are caused by this medicine, tell your doctor     Call your doctor for medical advice about side effects  You may report side effects to FDA at 8-981-FDA-7092  © 2017 [address] Information is for End User's use only and may not be sold, redistributed or otherwise used for commercial purposes  The above information is an  only  It is not intended as medical advice for individual conditions or treatments  Talk to your doctor, nurse or pharmacist before following any medical regimen to see if it is safe and effective for you

## 2020-03-06 NOTE — LETTER
March 6, 2020     Patient: Yenny Wilson   YOB: 1970   Date of Visit: 3/6/2020       To Whom it May Concern:    Yenny Wilson is under my professional care  He was seen in my office on 3/6/2020  Please excuse him from work from 3/6/20 through 3/13/20  If you have any questions or concerns, please don't hesitate to call           Sincerely,          Kaitlin Ahmadi PA-C        CC: No Recipients

## 2020-04-22 ENCOUNTER — TELEMEDICINE (OUTPATIENT)
Dept: PAIN MEDICINE | Facility: CLINIC | Age: 50
End: 2020-04-22
Payer: COMMERCIAL

## 2020-04-22 DIAGNOSIS — M54.42 CHRONIC BILATERAL LOW BACK PAIN WITH LEFT-SIDED SCIATICA: Primary | ICD-10-CM

## 2020-04-22 DIAGNOSIS — M54.16 LUMBAR RADICULOPATHY: ICD-10-CM

## 2020-04-22 DIAGNOSIS — G89.29 CHRONIC BILATERAL LOW BACK PAIN WITH LEFT-SIDED SCIATICA: Primary | ICD-10-CM

## 2020-04-22 PROCEDURE — G2012 BRIEF CHECK IN BY MD/QHP: HCPCS | Performed by: PHYSICIAN ASSISTANT

## 2020-05-27 ENCOUNTER — OFFICE VISIT (OUTPATIENT)
Dept: PAIN MEDICINE | Facility: CLINIC | Age: 50
End: 2020-05-27
Payer: COMMERCIAL

## 2020-05-27 VITALS
HEART RATE: 88 BPM | WEIGHT: 156 LBS | HEIGHT: 75 IN | BODY MASS INDEX: 19.4 KG/M2 | DIASTOLIC BLOOD PRESSURE: 80 MMHG | TEMPERATURE: 99.1 F | SYSTOLIC BLOOD PRESSURE: 120 MMHG

## 2020-05-27 DIAGNOSIS — M54.50 ACUTE EXACERBATION OF CHRONIC LOW BACK PAIN: ICD-10-CM

## 2020-05-27 DIAGNOSIS — G89.29 CHRONIC BILATERAL LOW BACK PAIN WITH RIGHT-SIDED SCIATICA: ICD-10-CM

## 2020-05-27 DIAGNOSIS — G89.29 ACUTE EXACERBATION OF CHRONIC LOW BACK PAIN: ICD-10-CM

## 2020-05-27 DIAGNOSIS — M54.16 LUMBAR RADICULOPATHY: ICD-10-CM

## 2020-05-27 DIAGNOSIS — M54.41 CHRONIC BILATERAL LOW BACK PAIN WITH RIGHT-SIDED SCIATICA: ICD-10-CM

## 2020-05-27 DIAGNOSIS — R29.90 ABNORMAL NEUROLOGICAL EXAM: Primary | ICD-10-CM

## 2020-05-27 PROCEDURE — 3008F BODY MASS INDEX DOCD: CPT | Performed by: PHYSICIAN ASSISTANT

## 2020-05-27 PROCEDURE — 99214 OFFICE O/P EST MOD 30 MIN: CPT | Performed by: PHYSICIAN ASSISTANT

## 2020-05-27 RX ORDER — IBUPROFEN 600 MG/1
600 TABLET ORAL EVERY 6 HOURS PRN
Qty: 120 TABLET | Refills: 2 | Status: SHIPPED | OUTPATIENT
Start: 2020-05-27

## 2020-05-29 ENCOUNTER — EVALUATION (OUTPATIENT)
Dept: PHYSICAL THERAPY | Facility: CLINIC | Age: 50
End: 2020-05-29
Payer: COMMERCIAL

## 2020-05-29 DIAGNOSIS — M54.41 CHRONIC BILATERAL LOW BACK PAIN WITH RIGHT-SIDED SCIATICA: Primary | ICD-10-CM

## 2020-05-29 DIAGNOSIS — G89.29 CHRONIC BILATERAL LOW BACK PAIN WITH RIGHT-SIDED SCIATICA: Primary | ICD-10-CM

## 2020-05-29 PROCEDURE — 97112 NEUROMUSCULAR REEDUCATION: CPT | Performed by: PHYSICAL THERAPIST

## 2020-05-29 PROCEDURE — 97162 PT EVAL MOD COMPLEX 30 MIN: CPT | Performed by: PHYSICAL THERAPIST

## 2021-01-01 ENCOUNTER — APPOINTMENT (INPATIENT)
Dept: RADIOLOGY | Facility: HOSPITAL | Age: 51
DRG: 059 | End: 2021-01-01
Payer: COMMERCIAL

## 2021-01-01 ENCOUNTER — APPOINTMENT (INPATIENT)
Dept: NUCLEAR MEDICINE | Facility: HOSPITAL | Age: 51
DRG: 059 | End: 2021-01-01
Payer: COMMERCIAL

## 2021-01-01 ENCOUNTER — HOSPITAL ENCOUNTER (INPATIENT)
Facility: HOSPITAL | Age: 51
LOS: 7 days | DRG: 059 | End: 2021-07-06
Attending: INTERNAL MEDICINE | Admitting: INTERNAL MEDICINE
Payer: COMMERCIAL

## 2021-01-01 ENCOUNTER — APPOINTMENT (INPATIENT)
Dept: NON INVASIVE DIAGNOSTICS | Facility: HOSPITAL | Age: 51
DRG: 059 | End: 2021-01-01
Payer: COMMERCIAL

## 2021-01-01 ENCOUNTER — APPOINTMENT (EMERGENCY)
Dept: CT IMAGING | Facility: HOSPITAL | Age: 51
End: 2021-01-01
Payer: COMMERCIAL

## 2021-01-01 ENCOUNTER — APPOINTMENT (EMERGENCY)
Dept: RADIOLOGY | Facility: HOSPITAL | Age: 51
End: 2021-01-01
Payer: COMMERCIAL

## 2021-01-01 ENCOUNTER — APPOINTMENT (INPATIENT)
Dept: CT IMAGING | Facility: HOSPITAL | Age: 51
DRG: 059 | End: 2021-01-01
Payer: COMMERCIAL

## 2021-01-01 ENCOUNTER — HOSPITAL ENCOUNTER (EMERGENCY)
Facility: HOSPITAL | Age: 51
End: 2021-06-29
Attending: EMERGENCY MEDICINE | Admitting: EMERGENCY MEDICINE
Payer: COMMERCIAL

## 2021-01-01 VITALS
SYSTOLIC BLOOD PRESSURE: 124 MMHG | DIASTOLIC BLOOD PRESSURE: 68 MMHG | TEMPERATURE: 94.9 F | WEIGHT: 194 LBS | HEART RATE: 79 BPM | OXYGEN SATURATION: 95 % | RESPIRATION RATE: 22 BRPM

## 2021-01-01 DIAGNOSIS — I46.9 CARDIAC ARREST (HCC): Primary | ICD-10-CM

## 2021-01-01 LAB
ABO GROUP BLD: NORMAL
ABO GROUP BLD: NORMAL
ALBUMIN SERPL BCP-MCNC: 1.6 G/DL (ref 3.5–5)
ALBUMIN SERPL BCP-MCNC: 2.2 G/DL (ref 3.5–5)
ALBUMIN SERPL BCP-MCNC: 2.6 G/DL (ref 3.5–5)
ALBUMIN SERPL BCP-MCNC: 2.7 G/DL (ref 3.5–5)
ALBUMIN SERPL BCP-MCNC: 3.4 G/DL (ref 3–5.2)
ALP SERPL-CCNC: 106 U/L (ref 43–122)
ALP SERPL-CCNC: 141 U/L (ref 46–116)
ALP SERPL-CCNC: 150 U/L (ref 46–116)
ALP SERPL-CCNC: 67 U/L (ref 46–116)
ALP SERPL-CCNC: 87 U/L (ref 46–116)
ALT SERPL W P-5'-P-CCNC: 109 U/L (ref 12–78)
ALT SERPL W P-5'-P-CCNC: 172 U/L
ALT SERPL W P-5'-P-CCNC: 222 U/L (ref 12–78)
ALT SERPL W P-5'-P-CCNC: 240 U/L (ref 12–78)
ALT SERPL W P-5'-P-CCNC: 343 U/L (ref 12–78)
AMPHETAMINES SERPL QL SCN: NEGATIVE
ANION GAP SERPL CALCULATED.3IONS-SCNC: 10 MMOL/L (ref 4–13)
ANION GAP SERPL CALCULATED.3IONS-SCNC: 10 MMOL/L (ref 4–13)
ANION GAP SERPL CALCULATED.3IONS-SCNC: 11 MMOL/L (ref 4–13)
ANION GAP SERPL CALCULATED.3IONS-SCNC: 17 MMOL/L (ref 5–14)
ANION GAP SERPL CALCULATED.3IONS-SCNC: 4 MMOL/L (ref 4–13)
ANION GAP SERPL CALCULATED.3IONS-SCNC: 5 MMOL/L (ref 4–13)
ANION GAP SERPL CALCULATED.3IONS-SCNC: 6 MMOL/L (ref 4–13)
ANION GAP SERPL CALCULATED.3IONS-SCNC: 7 MMOL/L (ref 4–13)
ANION GAP SERPL CALCULATED.3IONS-SCNC: 7 MMOL/L (ref 4–13)
ANION GAP SERPL CALCULATED.3IONS-SCNC: 8 MMOL/L (ref 4–13)
ANION GAP SERPL CALCULATED.3IONS-SCNC: 9 MMOL/L (ref 4–13)
ANION GAP SERPL CALCULATED.3IONS-SCNC: 9 MMOL/L (ref 4–13)
APTT PPP: 32 SECONDS (ref 23–37)
APTT PPP: 51 SECONDS (ref 23–37)
ARTERIAL PATENCY WRIST A: YES
AST SERPL W P-5'-P-CCNC: 170 U/L (ref 5–45)
AST SERPL W P-5'-P-CCNC: 390 U/L (ref 17–59)
AST SERPL W P-5'-P-CCNC: 400 U/L (ref 5–45)
AST SERPL W P-5'-P-CCNC: 415 U/L (ref 5–45)
AST SERPL W P-5'-P-CCNC: 483 U/L (ref 5–45)
ATRIAL RATE: 106 BPM
ATRIAL RATE: 108 BPM
ATRIAL RATE: 132 BPM
ATRIAL RATE: 59 BPM
ATRIAL RATE: 73 BPM
BACTERIA BLD CULT: NORMAL
BACTERIA BLD CULT: NORMAL
BACTERIA UR QL AUTO: ABNORMAL /HPF
BACTERIA UR QL AUTO: ABNORMAL /HPF
BARBITURATES UR QL: NEGATIVE
BASE EXCESS BLDA CALC-SCNC: -1.3 MMOL/L
BASE EXCESS BLDA CALC-SCNC: -12.9 MMOL/L (ref -2.1–2.1)
BASE EXCESS BLDA CALC-SCNC: -3.4 MMOL/L
BASE EXCESS BLDA CALC-SCNC: -4.9 MMOL/L
BASE EXCESS BLDA CALC-SCNC: 0.7 MMOL/L
BASE EXCESS BLDA CALC-SCNC: 3.5 MMOL/L
BASE EXCESS BLDA CALC-SCNC: 4.3 MMOL/L
BASOPHILS # BLD MANUAL: 0 THOUSAND/UL (ref 0–0.1)
BASOPHILS NFR MAR MANUAL: 0 % (ref 0–1)
BENZODIAZ UR QL: NEGATIVE
BILIRUB DIRECT SERPL-MCNC: 0.1 MG/DL (ref 0–0.2)
BILIRUB DIRECT SERPL-MCNC: 0.24 MG/DL (ref 0–0.2)
BILIRUB SERPL-MCNC: 0.26 MG/DL (ref 0.2–1)
BILIRUB SERPL-MCNC: 0.6 MG/DL (ref 0.2–1)
BILIRUB SERPL-MCNC: 0.79 MG/DL (ref 0.2–1)
BILIRUB SERPL-MCNC: 0.81 MG/DL (ref 0.2–1)
BILIRUB SERPL-MCNC: 1.44 MG/DL
BILIRUB UR QL STRIP: NEGATIVE
BILIRUB UR QL STRIP: NEGATIVE
BLD GP AB SCN SERPL QL: NEGATIVE
BODY TEMPERATURE: 96.4 DEGREES FEHRENHEIT
BODY TEMPERATURE: 99 DEGREES FEHRENHEIT
BUN SERPL-MCNC: 14 MG/DL (ref 5–25)
BUN SERPL-MCNC: 14 MG/DL (ref 5–25)
BUN SERPL-MCNC: 16 MG/DL (ref 5–25)
BUN SERPL-MCNC: 17 MG/DL (ref 5–25)
BUN SERPL-MCNC: 19 MG/DL (ref 5–25)
BUN SERPL-MCNC: 19 MG/DL (ref 5–25)
BUN SERPL-MCNC: 20 MG/DL (ref 5–25)
BUN SERPL-MCNC: 21 MG/DL (ref 5–25)
BUN SERPL-MCNC: 22 MG/DL (ref 5–25)
BUN SERPL-MCNC: 24 MG/DL (ref 5–25)
BUN SERPL-MCNC: 25 MG/DL (ref 5–25)
BUN SERPL-MCNC: 33 MG/DL (ref 5–25)
CA-I BLD-SCNC: 1.04 MMOL/L (ref 1.12–1.32)
CA-I BLD-SCNC: 1.12 MMOL/L (ref 1.12–1.32)
CALCIUM ALBUM COR SERPL-MCNC: 8.8 MG/DL (ref 8.3–10.1)
CALCIUM SERPL-MCNC: 7.2 MG/DL (ref 8.3–10.1)
CALCIUM SERPL-MCNC: 7.4 MG/DL (ref 8.3–10.1)
CALCIUM SERPL-MCNC: 7.4 MG/DL (ref 8.3–10.1)
CALCIUM SERPL-MCNC: 7.5 MG/DL (ref 8.3–10.1)
CALCIUM SERPL-MCNC: 7.5 MG/DL (ref 8.3–10.1)
CALCIUM SERPL-MCNC: 7.7 MG/DL (ref 8.3–10.1)
CALCIUM SERPL-MCNC: 7.8 MG/DL (ref 8.3–10.1)
CALCIUM SERPL-MCNC: 8 MG/DL (ref 8.3–10.1)
CALCIUM SERPL-MCNC: 8 MG/DL (ref 8.3–10.1)
CALCIUM SERPL-MCNC: 8.3 MG/DL (ref 8.3–10.1)
CALCIUM SERPL-MCNC: 8.3 MG/DL (ref 8.4–10.2)
CALCIUM SERPL-MCNC: 8.4 MG/DL (ref 8.3–10.1)
CALCIUM SERPL-MCNC: 8.6 MG/DL (ref 8.3–10.1)
CALCIUM SERPL-MCNC: 8.7 MG/DL (ref 8.3–10.1)
CHLORIDE SERPL-SCNC: 101 MMOL/L (ref 100–108)
CHLORIDE SERPL-SCNC: 101 MMOL/L (ref 97–108)
CHLORIDE SERPL-SCNC: 103 MMOL/L (ref 100–108)
CHLORIDE SERPL-SCNC: 113 MMOL/L (ref 100–108)
CHLORIDE SERPL-SCNC: 117 MMOL/L (ref 100–108)
CHLORIDE SERPL-SCNC: 121 MMOL/L (ref 100–108)
CHLORIDE SERPL-SCNC: 121 MMOL/L (ref 100–108)
CHLORIDE SERPL-SCNC: 122 MMOL/L (ref 100–108)
CHLORIDE SERPL-SCNC: 123 MMOL/L (ref 100–108)
CHLORIDE SERPL-SCNC: 124 MMOL/L (ref 100–108)
CHLORIDE SERPL-SCNC: 125 MMOL/L (ref 100–108)
CK MB SERPL-MCNC: 1.6 % (ref 0–2.5)
CK MB SERPL-MCNC: 18.2 NG/ML (ref 0–5)
CK SERPL-CCNC: 1111 U/L (ref 39–308)
CLARITY UR: ABNORMAL
CLARITY UR: CLEAR
CO2 SERPL-SCNC: 15 MMOL/L (ref 22–30)
CO2 SERPL-SCNC: 22 MMOL/L (ref 21–32)
CO2 SERPL-SCNC: 24 MMOL/L (ref 21–32)
CO2 SERPL-SCNC: 24 MMOL/L (ref 21–32)
CO2 SERPL-SCNC: 26 MMOL/L (ref 21–32)
CO2 SERPL-SCNC: 26 MMOL/L (ref 21–32)
CO2 SERPL-SCNC: 27 MMOL/L (ref 21–32)
CO2 SERPL-SCNC: 28 MMOL/L (ref 21–32)
CO2 SERPL-SCNC: 29 MMOL/L (ref 21–32)
CO2 SERPL-SCNC: 29 MMOL/L (ref 21–32)
CO2 SERPL-SCNC: 30 MMOL/L (ref 21–32)
CO2 SERPL-SCNC: 31 MMOL/L (ref 21–32)
CO2 SERPL-SCNC: 33 MMOL/L (ref 21–32)
CO2 SERPL-SCNC: 33 MMOL/L (ref 21–32)
CO2 SERPL-SCNC: 36 MMOL/L (ref 21–32)
COCAINE UR QL: NEGATIVE
COLOR UR: ABNORMAL
COLOR UR: ABNORMAL
CREAT SERPL-MCNC: 1.26 MG/DL (ref 0.6–1.3)
CREAT SERPL-MCNC: 1.31 MG/DL (ref 0.6–1.3)
CREAT SERPL-MCNC: 1.38 MG/DL (ref 0.6–1.3)
CREAT SERPL-MCNC: 1.48 MG/DL (ref 0.7–1.5)
CREAT SERPL-MCNC: 1.53 MG/DL (ref 0.6–1.3)
CREAT SERPL-MCNC: 1.56 MG/DL (ref 0.6–1.3)
CREAT SERPL-MCNC: 1.56 MG/DL (ref 0.6–1.3)
CREAT SERPL-MCNC: 1.57 MG/DL (ref 0.6–1.3)
CREAT SERPL-MCNC: 1.6 MG/DL (ref 0.6–1.3)
CREAT SERPL-MCNC: 1.61 MG/DL (ref 0.6–1.3)
CREAT SERPL-MCNC: 1.64 MG/DL (ref 0.6–1.3)
CREAT SERPL-MCNC: 1.66 MG/DL (ref 0.6–1.3)
CREAT SERPL-MCNC: 1.72 MG/DL (ref 0.6–1.3)
CREAT SERPL-MCNC: 1.78 MG/DL (ref 0.6–1.3)
CREAT SERPL-MCNC: 1.79 MG/DL (ref 0.6–1.3)
CREAT SERPL-MCNC: 1.79 MG/DL (ref 0.6–1.3)
CREAT SERPL-MCNC: 1.8 MG/DL (ref 0.6–1.3)
CREAT SERPL-MCNC: 1.97 MG/DL (ref 0.6–1.3)
EOSINOPHIL # BLD MANUAL: 0 THOUSAND/UL (ref 0–0.4)
EOSINOPHIL NFR BLD MANUAL: 0 % (ref 0–6)
ERYTHROCYTE [DISTWIDTH] IN BLOOD BY AUTOMATED COUNT: 11.6 % (ref 11.6–15.1)
ERYTHROCYTE [DISTWIDTH] IN BLOOD BY AUTOMATED COUNT: 11.7 % (ref 11.6–15.1)
ERYTHROCYTE [DISTWIDTH] IN BLOOD BY AUTOMATED COUNT: 11.8 % (ref 11.6–15.1)
ERYTHROCYTE [DISTWIDTH] IN BLOOD BY AUTOMATED COUNT: 12.4 % (ref 11.6–15.1)
ERYTHROCYTE [DISTWIDTH] IN BLOOD BY AUTOMATED COUNT: 12.5 % (ref 11.6–15.1)
ERYTHROCYTE [DISTWIDTH] IN BLOOD BY AUTOMATED COUNT: 12.6 % (ref 11.6–15.1)
ERYTHROCYTE [DISTWIDTH] IN BLOOD BY AUTOMATED COUNT: 13.8 %
ETHANOL SERPL-MCNC: <10 MG/DL (ref 0–10)
GFR SERPL CREATININE-BSD FRML MDRD: 38 ML/MIN/1.73SQ M
GFR SERPL CREATININE-BSD FRML MDRD: 43 ML/MIN/1.73SQ M
GFR SERPL CREATININE-BSD FRML MDRD: 44 ML/MIN/1.73SQ M
GFR SERPL CREATININE-BSD FRML MDRD: 45 ML/MIN/1.73SQ M
GFR SERPL CREATININE-BSD FRML MDRD: 47 ML/MIN/1.73SQ M
GFR SERPL CREATININE-BSD FRML MDRD: 48 ML/MIN/1.73SQ M
GFR SERPL CREATININE-BSD FRML MDRD: 49 ML/MIN/1.73SQ M
GFR SERPL CREATININE-BSD FRML MDRD: 49 ML/MIN/1.73SQ M
GFR SERPL CREATININE-BSD FRML MDRD: 51 ML/MIN/1.73SQ M
GFR SERPL CREATININE-BSD FRML MDRD: 52 ML/MIN/1.73SQ M
GFR SERPL CREATININE-BSD FRML MDRD: 54 ML/MIN/1.73SQ M
GFR SERPL CREATININE-BSD FRML MDRD: 59 ML/MIN/1.73SQ M
GFR SERPL CREATININE-BSD FRML MDRD: 63 ML/MIN/1.73SQ M
GFR SERPL CREATININE-BSD FRML MDRD: 66 ML/MIN/1.73SQ M
GLUCOSE SERPL-MCNC: 100 MG/DL (ref 65–140)
GLUCOSE SERPL-MCNC: 103 MG/DL (ref 65–140)
GLUCOSE SERPL-MCNC: 118 MG/DL (ref 65–140)
GLUCOSE SERPL-MCNC: 121 MG/DL (ref 65–140)
GLUCOSE SERPL-MCNC: 125 MG/DL (ref 65–140)
GLUCOSE SERPL-MCNC: 128 MG/DL (ref 65–140)
GLUCOSE SERPL-MCNC: 129 MG/DL (ref 65–140)
GLUCOSE SERPL-MCNC: 130 MG/DL (ref 65–140)
GLUCOSE SERPL-MCNC: 132 MG/DL (ref 65–140)
GLUCOSE SERPL-MCNC: 137 MG/DL (ref 65–140)
GLUCOSE SERPL-MCNC: 147 MG/DL (ref 65–140)
GLUCOSE SERPL-MCNC: 158 MG/DL (ref 65–140)
GLUCOSE SERPL-MCNC: 159 MG/DL (ref 65–140)
GLUCOSE SERPL-MCNC: 162 MG/DL (ref 65–140)
GLUCOSE SERPL-MCNC: 166 MG/DL (ref 65–140)
GLUCOSE SERPL-MCNC: 169 MG/DL (ref 65–140)
GLUCOSE SERPL-MCNC: 169 MG/DL (ref 65–140)
GLUCOSE SERPL-MCNC: 189 MG/DL (ref 65–140)
GLUCOSE SERPL-MCNC: 193 MG/DL (ref 65–140)
GLUCOSE SERPL-MCNC: 203 MG/DL (ref 65–140)
GLUCOSE SERPL-MCNC: 205 MG/DL (ref 65–140)
GLUCOSE SERPL-MCNC: 206 MG/DL (ref 65–140)
GLUCOSE SERPL-MCNC: 211 MG/DL (ref 65–140)
GLUCOSE SERPL-MCNC: 244 MG/DL (ref 70–99)
GLUCOSE SERPL-MCNC: 84 MG/DL (ref 65–140)
GLUCOSE SERPL-MCNC: 90 MG/DL (ref 65–140)
GLUCOSE SERPL-MCNC: 93 MG/DL (ref 65–140)
GLUCOSE UR STRIP-MCNC: ABNORMAL MG/DL
GLUCOSE UR STRIP-MCNC: NEGATIVE MG/DL
HCO3 BLDA-SCNC: 15.7 MMOL/L (ref 22–26)
HCO3 BLDA-SCNC: 19.2 MMOL/L (ref 22–28)
HCO3 BLDA-SCNC: 20.2 MMOL/L (ref 22–28)
HCO3 BLDA-SCNC: 22.8 MMOL/L (ref 22–28)
HCO3 BLDA-SCNC: 24.1 MMOL/L (ref 22–28)
HCO3 BLDA-SCNC: 30.8 MMOL/L (ref 22–28)
HCO3 BLDA-SCNC: 31.8 MMOL/L (ref 22–28)
HCT VFR BLD AUTO: 31.1 % (ref 36.5–49.3)
HCT VFR BLD AUTO: 33.2 % (ref 36.5–49.3)
HCT VFR BLD AUTO: 34.6 % (ref 36.5–49.3)
HCT VFR BLD AUTO: 36.9 % (ref 36.5–49.3)
HCT VFR BLD AUTO: 37.7 % (ref 36.5–49.3)
HCT VFR BLD AUTO: 39.3 % (ref 36.5–49.3)
HCT VFR BLD AUTO: 41 % (ref 41–53)
HGB BLD-MCNC: 11.2 G/DL (ref 12–17)
HGB BLD-MCNC: 11.4 G/DL (ref 12–17)
HGB BLD-MCNC: 11.5 G/DL (ref 12–17)
HGB BLD-MCNC: 12.3 G/DL (ref 13.5–17.5)
HGB BLD-MCNC: 13.1 G/DL (ref 12–17)
HGB BLD-MCNC: 13.6 G/DL (ref 12–17)
HGB BLD-MCNC: 14.4 G/DL (ref 12–17)
HGB BLD-MCNC: 9.9 G/DL (ref 12–17)
HGB UR QL STRIP.AUTO: 250
HGB UR QL STRIP.AUTO: ABNORMAL
HOROWITZ INDEX BLDA+IHG-RTO: 40 MM[HG]
HOROWITZ INDEX BLDA+IHG-RTO: 40 MM[HG]
HOROWITZ INDEX BLDA+IHG-RTO: 50 MM[HG]
HOROWITZ INDEX BLDA+IHG-RTO: 50 MM[HG]
HOROWITZ INDEX BLDA+IHG-RTO: 60 MM[HG]
HOROWITZ INDEX BLDA+IHG-RTO: 60 MM[HG]
INR PPP: 1.48 (ref 0.84–1.19)
INR PPP: 2.14 (ref 0.84–1.19)
KETONES UR STRIP-MCNC: ABNORMAL MG/DL
KETONES UR STRIP-MCNC: NEGATIVE MG/DL
LACTATE SERPL-SCNC: 2.9 MMOL/L (ref 0.5–2)
LACTATE SERPL-SCNC: 23 MMOL/L (ref 0.7–2)
LACTATE SERPL-SCNC: 4.4 MMOL/L (ref 0.5–2)
LACTATE SERPL-SCNC: 6.6 MMOL/L (ref 0.7–2)
LEUKOCYTE ESTERASE UR QL STRIP: NEGATIVE
LEUKOCYTE ESTERASE UR QL STRIP: NEGATIVE
LYMPHOCYTES # BLD AUTO: 0.72 THOUSAND/UL (ref 0.6–4.47)
LYMPHOCYTES # BLD AUTO: 11 % (ref 14–44)
LYMPHOCYTES # BLD AUTO: 3.65 THOUSAND/UL (ref 0.5–4)
LYMPHOCYTES # BLD AUTO: 89 % (ref 25–45)
MACROCYTES BLD QL AUTO: PRESENT
MAGNESIUM SERPL-MCNC: 2.2 MG/DL (ref 1.6–2.6)
MAGNESIUM SERPL-MCNC: 2.7 MG/DL (ref 1.6–2.6)
MAGNESIUM SERPL-MCNC: 3.4 MG/DL (ref 1.6–2.6)
MCH RBC QN AUTO: 31.8 PG (ref 26.8–34.3)
MCH RBC QN AUTO: 31.8 PG (ref 26.8–34.3)
MCH RBC QN AUTO: 31.9 PG (ref 26.8–34.3)
MCH RBC QN AUTO: 32 PG (ref 26.8–34.3)
MCH RBC QN AUTO: 32.4 PG (ref 26.8–34.3)
MCH RBC QN AUTO: 32.5 PG (ref 26.8–34.3)
MCH RBC QN AUTO: 32.5 PG (ref 26.8–34.3)
MCHC RBC AUTO-ENTMCNC: 30.1 G/DL (ref 31.4–37.4)
MCHC RBC AUTO-ENTMCNC: 30.9 G/DL (ref 31.4–37.4)
MCHC RBC AUTO-ENTMCNC: 31.8 G/DL (ref 31.4–37.4)
MCHC RBC AUTO-ENTMCNC: 32.4 G/DL (ref 31.4–37.4)
MCHC RBC AUTO-ENTMCNC: 34.6 G/DL (ref 31.4–37.4)
MCHC RBC AUTO-ENTMCNC: 34.6 G/DL (ref 31.4–37.4)
MCHC RBC AUTO-ENTMCNC: 34.7 G/DL (ref 31.4–37.4)
MCV RBC AUTO: 100 FL (ref 82–98)
MCV RBC AUTO: 100 FL (ref 82–98)
MCV RBC AUTO: 103 FL (ref 82–98)
MCV RBC AUTO: 108 FL (ref 80–100)
MCV RBC AUTO: 92 FL (ref 82–98)
MCV RBC AUTO: 93 FL (ref 82–98)
MCV RBC AUTO: 94 FL (ref 82–98)
METAMYELOCYTES NFR BLD MANUAL: 4 % (ref 0–1)
METHADONE UR QL: NEGATIVE
MONOCYTES # BLD AUTO: 0.07 THOUSAND/UL (ref 0–1.22)
MONOCYTES # BLD AUTO: 0.16 THOUSAND/UL (ref 0.2–0.9)
MONOCYTES NFR BLD AUTO: 4 % (ref 1–10)
MONOCYTES NFR BLD: 1 % (ref 4–12)
MUCOUS THREADS UR QL AUTO: ABNORMAL
MYELOCYTES NFR BLD MANUAL: 1 % (ref 0–1)
NEUTROPHILS # BLD MANUAL: 5.45 THOUSAND/UL (ref 1.85–7.62)
NEUTS BAND NFR BLD MANUAL: 11 % (ref 0–8)
NEUTS SEG # BLD: 0.29 THOUSAND/UL (ref 1.8–7.8)
NEUTS SEG NFR BLD AUTO: 7 %
NEUTS SEG NFR BLD AUTO: 72 % (ref 43–75)
NITRITE UR QL STRIP: NEGATIVE
NITRITE UR QL STRIP: NEGATIVE
NON-SQ EPI CELLS URNS QL MICRO: ABNORMAL /HPF
NON-SQ EPI CELLS URNS QL MICRO: ABNORMAL /HPF
NRBC BLD AUTO-RTO: 0 /100 WBCS
NRBC BLD AUTO-RTO: 2 /100 WBC (ref 0–2)
NT-PROBNP SERPL-MCNC: 22 PG/ML
O2 CT BLDA-SCNC: 14.1 ML/DL (ref 16–23)
O2 CT BLDA-SCNC: 14.2 ML/DL (ref 16–23)
O2 CT BLDA-SCNC: 16.7 ML/DL (ref 16–23)
O2 CT BLDA-SCNC: 18.8 ML/DL (ref 16–23)
O2 CT BLDA-SCNC: 18.9 ML/DL (ref 16–23)
O2 CT BLDA-SCNC: 18.9 ML/DL (ref 16–23)
O2 CT BLDA-SCNC: 19.3 ML/DL (ref 16–23)
O2 CT BLDA-SCNC: 20.1 ML/DL (ref 16–23)
OPIATES UR QL SCN: NEGATIVE
OXYCODONE+OXYMORPHONE UR QL SCN: NEGATIVE
OXYHGB MFR BLDA: 94.8 % (ref 94–97)
OXYHGB MFR BLDA: 95.2 % (ref 95–98)
OXYHGB MFR BLDA: 95.5 % (ref 94–97)
OXYHGB MFR BLDA: 95.9 % (ref 95–98)
OXYHGB MFR BLDA: 96.7 % (ref 94–97)
OXYHGB MFR BLDA: 97.7 % (ref 94–97)
OXYHGB MFR BLDA: 97.7 % (ref 94–97)
OXYHGB MFR BLDA: 97.9 % (ref 94–97)
P AXIS: 70 DEGREES
P AXIS: 76 DEGREES
P AXIS: 77 DEGREES
P AXIS: 80 DEGREES
P AXIS: 85 DEGREES
PCO2 BLDA: 28 MM HG (ref 36–44)
PCO2 BLDA: 34.2 MM HG (ref 36–44)
PCO2 BLDA: 36.4 MM HG (ref 36–44)
PCO2 BLDA: 38 MM HG (ref 35–45)
PCO2 BLDA: 38.1 MM HG (ref 36–44)
PCO2 BLDA: 45 MM HG (ref 35–45)
PCO2 BLDA: 56.7 MM HG (ref 36–44)
PCO2 BLDA: 70.1 MM HG (ref 36–44)
PCO2 TEMP ADJ BLDA: 28.2 MM HG (ref 36–44)
PCO2 TEMP ADJ BLDA: 32.5 MM HG (ref 36–44)
PCP UR QL: NEGATIVE
PEEP RESPIRATORY: 6 CM[H2O]
PEEP RESPIRATORY: 6 CM[H2O]
PEEP RESPIRATORY: 8 CM[H2O]
PH BLD: 7.45 [PH] (ref 7.35–7.45)
PH BLD: 7.48 [PH] (ref 7.35–7.45)
PH BLDA: 7.15 [PH] (ref 7.35–7.45)
PH BLDA: 7.27 [PH] (ref 7.35–7.45)
PH BLDA: 7.34 [PH] (ref 7.35–7.45)
PH BLDA: 7.35 [PH] (ref 7.35–7.45)
PH BLDA: 7.41 [PH] (ref 7.35–7.45)
PH BLDA: 7.45 [PH] (ref 7.35–7.45)
PH BLDA: 7.46 [PH] (ref 7.35–7.45)
PH BLDA: <6.8 [PH] (ref 7.35–7.45)
PH UR STRIP.AUTO: 5.5 [PH]
PH UR STRIP.AUTO: 7 [PH]
PHOSPHATE SERPL-MCNC: 5 MG/DL (ref 2.7–4.5)
PHOSPHATE SERPL-MCNC: 5.1 MG/DL (ref 2.7–4.5)
PLATELET # BLD AUTO: 123 THOUSANDS/UL (ref 149–390)
PLATELET # BLD AUTO: 141 THOUSANDS/UL (ref 149–390)
PLATELET # BLD AUTO: 149 THOUSANDS/UL (ref 149–390)
PLATELET # BLD AUTO: 158 THOUSANDS/UL (ref 149–390)
PLATELET # BLD AUTO: 185 THOUSANDS/UL (ref 150–450)
PLATELET # BLD AUTO: 186 THOUSANDS/UL (ref 149–390)
PLATELET # BLD AUTO: 221 THOUSANDS/UL (ref 149–390)
PLATELET BLD QL SMEAR: ADEQUATE
PLATELET BLD QL SMEAR: ADEQUATE
PMV BLD AUTO: 10.1 FL (ref 8.9–12.7)
PMV BLD AUTO: 8.9 FL (ref 8.9–12.7)
PMV BLD AUTO: 9 FL (ref 8.9–12.7)
PMV BLD AUTO: 9.2 FL (ref 8.9–12.7)
PMV BLD AUTO: 9.3 FL (ref 8.9–12.7)
PMV BLD AUTO: 9.4 FL (ref 8.9–12.7)
PMV BLD AUTO: 9.7 FL (ref 8.9–12.7)
PO2 BLD: 132.6 MM HG (ref 75–129)
PO2 BLD: 152.4 MM HG (ref 75–129)
PO2 BLDA: 117 MM HG (ref 80–105)
PO2 BLDA: 125 MM HG (ref 75–129)
PO2 BLDA: 139.7 MM HG (ref 75–129)
PO2 BLDA: 151.2 MM HG (ref 75–129)
PO2 BLDA: 542 MM HG (ref 80–105)
PO2 BLDA: 85.2 MM HG (ref 75–129)
PO2 BLDA: 92.6 MM HG (ref 75–129)
PO2 BLDA: 92.9 MM HG (ref 75–129)
POTASSIUM SERPL-SCNC: 3.1 MMOL/L (ref 3.5–5.3)
POTASSIUM SERPL-SCNC: 3.5 MMOL/L (ref 3.5–5.3)
POTASSIUM SERPL-SCNC: 3.6 MMOL/L (ref 3.5–5.3)
POTASSIUM SERPL-SCNC: 3.7 MMOL/L (ref 3.5–5.3)
POTASSIUM SERPL-SCNC: 3.7 MMOL/L (ref 3.5–5.3)
POTASSIUM SERPL-SCNC: 3.9 MMOL/L (ref 3.5–5.3)
POTASSIUM SERPL-SCNC: 4 MMOL/L (ref 3.5–5.3)
POTASSIUM SERPL-SCNC: 4.3 MMOL/L (ref 3.5–5.3)
POTASSIUM SERPL-SCNC: 4.3 MMOL/L (ref 3.5–5.3)
POTASSIUM SERPL-SCNC: 4.5 MMOL/L (ref 3.5–5.3)
POTASSIUM SERPL-SCNC: 4.8 MMOL/L (ref 3.6–5)
PR INTERVAL: 117 MS
PR INTERVAL: 146 MS
PR INTERVAL: 158 MS
PR INTERVAL: 158 MS
PR INTERVAL: 208 MS
PROCALCITONIN SERPL-MCNC: 17.19 NG/ML
PROCALCITONIN SERPL-MCNC: 3.77 NG/ML
PROCALCITONIN SERPL-MCNC: 31.2 NG/ML
PROCALCITONIN SERPL-MCNC: 38.44 NG/ML
PROT SERPL-MCNC: 5.1 G/DL (ref 6.4–8.2)
PROT SERPL-MCNC: 5.4 G/DL (ref 6.4–8.2)
PROT SERPL-MCNC: 5.5 G/DL (ref 6.4–8.2)
PROT SERPL-MCNC: 5.9 G/DL (ref 6.4–8.2)
PROT SERPL-MCNC: 6.1 G/DL (ref 5.9–8.4)
PROT UR STRIP-MCNC: >=500 MG/DL
PROT UR STRIP-MCNC: NEGATIVE MG/DL
PROTHROMBIN TIME: 17.6 SECONDS (ref 11.6–14.5)
PROTHROMBIN TIME: 24 SECONDS (ref 11.6–14.5)
PS CM H2O: 8
PS SUPP: 8
PS VENT FIO2: 100
PS VENT PEEP: 8
QRS AXIS: 1 DEGREES
QRS AXIS: 11 DEGREES
QRS AXIS: 49 DEGREES
QRS AXIS: 71 DEGREES
QRS AXIS: 81 DEGREES
QRSD INTERVAL: 110 MS
QRSD INTERVAL: 83 MS
QRSD INTERVAL: 84 MS
QRSD INTERVAL: 84 MS
QRSD INTERVAL: 88 MS
QT INTERVAL: 310 MS
QT INTERVAL: 314 MS
QT INTERVAL: 329 MS
QT INTERVAL: 375 MS
QT INTERVAL: 458 MS
QTC INTERVAL: 411 MS
QTC INTERVAL: 414 MS
QTC INTERVAL: 420 MS
QTC INTERVAL: 453 MS
QTC INTERVAL: 488 MS
RBC # BLD AUTO: 3.1 MILLION/UL (ref 3.88–5.62)
RBC # BLD AUTO: 3.45 MILLION/UL (ref 3.88–5.62)
RBC # BLD AUTO: 3.58 MILLION/UL (ref 3.88–5.62)
RBC # BLD AUTO: 3.59 MILLION/UL (ref 3.88–5.62)
RBC # BLD AUTO: 3.8 MILLION/UL (ref 4.5–5.9)
RBC # BLD AUTO: 4.12 MILLION/UL (ref 3.88–5.62)
RBC # BLD AUTO: 4.19 MILLION/UL (ref 3.88–5.62)
RBC #/AREA URNS AUTO: ABNORMAL /HPF
RBC #/AREA URNS AUTO: ABNORMAL /HPF
RBC MORPH BLD: ABNORMAL
RBC MORPH BLD: NORMAL
RH BLD: POSITIVE
RH BLD: POSITIVE
SARS-COV-2 RNA RESP QL NAA+PROBE: NEGATIVE
SIMV VENT INSPIRED AIR FIO2: 50
SIMV VENT PEEP: 8
SIMV VENT TIDAL VOLUME: 450
SIMV VENT: ABNORMAL
SODIUM SERPL-SCNC: 133 MMOL/L (ref 137–147)
SODIUM SERPL-SCNC: 135 MMOL/L (ref 136–145)
SODIUM SERPL-SCNC: 138 MMOL/L (ref 136–145)
SODIUM SERPL-SCNC: 149 MMOL/L (ref 136–145)
SODIUM SERPL-SCNC: 153 MMOL/L (ref 136–145)
SODIUM SERPL-SCNC: 155 MMOL/L (ref 136–145)
SODIUM SERPL-SCNC: 156 MMOL/L (ref 136–145)
SODIUM SERPL-SCNC: 157 MMOL/L (ref 136–145)
SODIUM SERPL-SCNC: 157 MMOL/L (ref 136–145)
SODIUM SERPL-SCNC: 158 MMOL/L (ref 136–145)
SODIUM SERPL-SCNC: 158 MMOL/L (ref 136–145)
SODIUM SERPL-SCNC: 159 MMOL/L (ref 136–145)
SODIUM SERPL-SCNC: 160 MMOL/L (ref 136–145)
SP GR UR STRIP.AUTO: 1.01 (ref 1–1.03)
SP GR UR STRIP.AUTO: 1.01 (ref 1–1.04)
SPECIMEN EXPIRATION DATE: NORMAL
SPECIMEN SOURCE: ABNORMAL
SPECIMEN SOURCE: NORMAL
T WAVE AXIS: 28 DEGREES
T WAVE AXIS: 51 DEGREES
T WAVE AXIS: 62 DEGREES
T WAVE AXIS: 73 DEGREES
T WAVE AXIS: 78 DEGREES
T4 FREE SERPL-MCNC: 0.5 NG/DL (ref 0.76–1.46)
THC UR QL: POSITIVE
TOTAL CELLS COUNTED SPEC: 100
TOTAL CELLS COUNTED SPEC: 100
TROPONIN I SERPL-MCNC: 0.23 NG/ML (ref 0–0.03)
TROPONIN I SERPL-MCNC: 1.61 NG/ML
TROPONIN I SERPL-MCNC: 2.76 NG/ML
TROPONIN I SERPL-MCNC: 2.8 NG/ML
UROBILINOGEN UA: NEGATIVE MG/DL
UROBILINOGEN UR QL STRIP.AUTO: 0.2 E.U./DL
VENT - PS: ABNORMAL
VENT AC: 12
VENT AC: 18
VENT AC: 24
VENT AC: 24
VENT SIMV: 14
VENT- AC: AC
VENTRICULAR RATE: 106 BPM
VENTRICULAR RATE: 108 BPM
VENTRICULAR RATE: 132 BPM
VENTRICULAR RATE: 59 BPM
VENTRICULAR RATE: 73 BPM
VT SETTING VENT: 450 ML
WBC # BLD AUTO: 10.79 THOUSAND/UL (ref 4.31–10.16)
WBC # BLD AUTO: 13.05 THOUSAND/UL (ref 4.31–10.16)
WBC # BLD AUTO: 13.14 THOUSAND/UL (ref 4.31–10.16)
WBC # BLD AUTO: 2.27 THOUSAND/UL (ref 4.31–10.16)
WBC # BLD AUTO: 4.1 THOUSAND/UL (ref 4.31–10.16)
WBC # BLD AUTO: 6.57 THOUSAND/UL (ref 4.31–10.16)
WBC # BLD AUTO: 8.07 THOUSAND/UL (ref 4.31–10.16)
WBC #/AREA URNS AUTO: ABNORMAL /HPF
WBC #/AREA URNS AUTO: ABNORMAL /HPF

## 2021-01-01 PROCEDURE — 94003 VENT MGMT INPAT SUBQ DAY: CPT

## 2021-01-01 PROCEDURE — G1004 CDSM NDSC: HCPCS

## 2021-01-01 PROCEDURE — 99291 CRITICAL CARE FIRST HOUR: CPT | Performed by: NURSE PRACTITIONER

## 2021-01-01 PROCEDURE — 85027 COMPLETE CBC AUTOMATED: CPT | Performed by: INTERNAL MEDICINE

## 2021-01-01 PROCEDURE — 93005 ELECTROCARDIOGRAM TRACING: CPT

## 2021-01-01 PROCEDURE — U0005 INFEC AGEN DETEC AMPLI PROBE: HCPCS | Performed by: EMERGENCY MEDICINE

## 2021-01-01 PROCEDURE — 80048 BASIC METABOLIC PNL TOTAL CA: CPT | Performed by: INTERNAL MEDICINE

## 2021-01-01 PROCEDURE — 36556 INSERT NON-TUNNEL CV CATH: CPT | Performed by: NURSE PRACTITIONER

## 2021-01-01 PROCEDURE — 80053 COMPREHEN METABOLIC PANEL: CPT | Performed by: INTERNAL MEDICINE

## 2021-01-01 PROCEDURE — 93010 ELECTROCARDIOGRAM REPORT: CPT | Performed by: INTERNAL MEDICINE

## 2021-01-01 PROCEDURE — U0003 INFECTIOUS AGENT DETECTION BY NUCLEIC ACID (DNA OR RNA); SEVERE ACUTE RESPIRATORY SYNDROME CORONAVIRUS 2 (SARS-COV-2) (CORONAVIRUS DISEASE [COVID-19]), AMPLIFIED PROBE TECHNIQUE, MAKING USE OF HIGH THROUGHPUT TECHNOLOGIES AS DESCRIBED BY CMS-2020-01-R: HCPCS | Performed by: EMERGENCY MEDICINE

## 2021-01-01 PROCEDURE — 78606 BRAIN IMAGE W/FLOW 4 + VIEWS: CPT

## 2021-01-01 PROCEDURE — 96365 THER/PROPH/DIAG IV INF INIT: CPT

## 2021-01-01 PROCEDURE — 82805 BLOOD GASES W/O2 SATURATION: CPT | Performed by: INTERNAL MEDICINE

## 2021-01-01 PROCEDURE — 85610 PROTHROMBIN TIME: CPT | Performed by: EMERGENCY MEDICINE

## 2021-01-01 PROCEDURE — 82553 CREATINE MB FRACTION: CPT | Performed by: INTERNAL MEDICINE

## 2021-01-01 PROCEDURE — NC001 PR NO CHARGE: Performed by: INTERNAL MEDICINE

## 2021-01-01 PROCEDURE — NC001 PR NO CHARGE: Performed by: NURSE PRACTITIONER

## 2021-01-01 PROCEDURE — 80076 HEPATIC FUNCTION PANEL: CPT | Performed by: NURSE PRACTITIONER

## 2021-01-01 PROCEDURE — 36600 WITHDRAWAL OF ARTERIAL BLOOD: CPT

## 2021-01-01 PROCEDURE — 80053 COMPREHEN METABOLIC PANEL: CPT | Performed by: EMERGENCY MEDICINE

## 2021-01-01 PROCEDURE — 72125 CT NECK SPINE W/O DYE: CPT

## 2021-01-01 PROCEDURE — 82330 ASSAY OF CALCIUM: CPT | Performed by: INTERNAL MEDICINE

## 2021-01-01 PROCEDURE — 93356 MYOCRD STRAIN IMG SPCKL TRCK: CPT

## 2021-01-01 PROCEDURE — 83605 ASSAY OF LACTIC ACID: CPT | Performed by: EMERGENCY MEDICINE

## 2021-01-01 PROCEDURE — 99291 CRITICAL CARE FIRST HOUR: CPT

## 2021-01-01 PROCEDURE — 84145 PROCALCITONIN (PCT): CPT | Performed by: INTERNAL MEDICINE

## 2021-01-01 PROCEDURE — 99291 CRITICAL CARE FIRST HOUR: CPT | Performed by: INTERNAL MEDICINE

## 2021-01-01 PROCEDURE — C9113 INJ PANTOPRAZOLE SODIUM, VIA: HCPCS | Performed by: NURSE PRACTITIONER

## 2021-01-01 PROCEDURE — 84484 ASSAY OF TROPONIN QUANT: CPT | Performed by: INTERNAL MEDICINE

## 2021-01-01 PROCEDURE — 94002 VENT MGMT INPAT INIT DAY: CPT

## 2021-01-01 PROCEDURE — 83735 ASSAY OF MAGNESIUM: CPT | Performed by: INTERNAL MEDICINE

## 2021-01-01 PROCEDURE — 85027 COMPLETE CBC AUTOMATED: CPT | Performed by: EMERGENCY MEDICINE

## 2021-01-01 PROCEDURE — 71045 X-RAY EXAM CHEST 1 VIEW: CPT

## 2021-01-01 PROCEDURE — 85610 PROTHROMBIN TIME: CPT | Performed by: INTERNAL MEDICINE

## 2021-01-01 PROCEDURE — 82948 REAGENT STRIP/BLOOD GLUCOSE: CPT

## 2021-01-01 PROCEDURE — 36620 INSERTION CATHETER ARTERY: CPT | Performed by: NURSE PRACTITIONER

## 2021-01-01 PROCEDURE — 82805 BLOOD GASES W/O2 SATURATION: CPT | Performed by: NURSE PRACTITIONER

## 2021-01-01 PROCEDURE — A9521 TC99M EXAMETAZIME: HCPCS

## 2021-01-01 PROCEDURE — 99292 CRITICAL CARE ADDL 30 MIN: CPT | Performed by: INTERNAL MEDICINE

## 2021-01-01 PROCEDURE — 84439 ASSAY OF FREE THYROXINE: CPT | Performed by: INTERNAL MEDICINE

## 2021-01-01 PROCEDURE — 93306 TTE W/DOPPLER COMPLETE: CPT

## 2021-01-01 PROCEDURE — 86900 BLOOD TYPING SEROLOGIC ABO: CPT | Performed by: NURSE PRACTITIONER

## 2021-01-01 PROCEDURE — 85025 COMPLETE CBC W/AUTO DIFF WBC: CPT | Performed by: INTERNAL MEDICINE

## 2021-01-01 PROCEDURE — 94760 N-INVAS EAR/PLS OXIMETRY 1: CPT

## 2021-01-01 PROCEDURE — 81001 URINALYSIS AUTO W/SCOPE: CPT | Performed by: INTERNAL MEDICINE

## 2021-01-01 PROCEDURE — 06HY33Z INSERTION OF INFUSION DEVICE INTO LOWER VEIN, PERCUTANEOUS APPROACH: ICD-10-PCS | Performed by: INTERNAL MEDICINE

## 2021-01-01 PROCEDURE — 92950 HEART/LUNG RESUSCITATION CPR: CPT

## 2021-01-01 PROCEDURE — 80076 HEPATIC FUNCTION PANEL: CPT | Performed by: INTERNAL MEDICINE

## 2021-01-01 PROCEDURE — 82805 BLOOD GASES W/O2 SATURATION: CPT | Performed by: PHYSICIAN ASSISTANT

## 2021-01-01 PROCEDURE — 99291 CRITICAL CARE FIRST HOUR: CPT | Performed by: EMERGENCY MEDICINE

## 2021-01-01 PROCEDURE — 84100 ASSAY OF PHOSPHORUS: CPT | Performed by: INTERNAL MEDICINE

## 2021-01-01 PROCEDURE — 85730 THROMBOPLASTIN TIME PARTIAL: CPT | Performed by: INTERNAL MEDICINE

## 2021-01-01 PROCEDURE — 96375 TX/PRO/DX INJ NEW DRUG ADDON: CPT

## 2021-01-01 PROCEDURE — 81001 URINALYSIS AUTO W/SCOPE: CPT | Performed by: EMERGENCY MEDICINE

## 2021-01-01 PROCEDURE — 82550 ASSAY OF CK (CPK): CPT | Performed by: INTERNAL MEDICINE

## 2021-01-01 PROCEDURE — 36415 COLL VENOUS BLD VENIPUNCTURE: CPT | Performed by: EMERGENCY MEDICINE

## 2021-01-01 PROCEDURE — 85007 BL SMEAR W/DIFF WBC COUNT: CPT | Performed by: INTERNAL MEDICINE

## 2021-01-01 PROCEDURE — 83605 ASSAY OF LACTIC ACID: CPT | Performed by: INTERNAL MEDICINE

## 2021-01-01 PROCEDURE — 96366 THER/PROPH/DIAG IV INF ADDON: CPT

## 2021-01-01 PROCEDURE — 70450 CT HEAD/BRAIN W/O DYE: CPT

## 2021-01-01 PROCEDURE — 99292 CRITICAL CARE ADDL 30 MIN: CPT | Performed by: EMERGENCY MEDICINE

## 2021-01-01 PROCEDURE — 80307 DRUG TEST PRSMV CHEM ANLYZR: CPT | Performed by: EMERGENCY MEDICINE

## 2021-01-01 PROCEDURE — 85730 THROMBOPLASTIN TIME PARTIAL: CPT | Performed by: EMERGENCY MEDICINE

## 2021-01-01 PROCEDURE — 82077 ASSAY SPEC XCP UR&BREATH IA: CPT | Performed by: EMERGENCY MEDICINE

## 2021-01-01 PROCEDURE — 31500 INSERT EMERGENCY AIRWAY: CPT | Performed by: PHYSICIAN ASSISTANT

## 2021-01-01 PROCEDURE — 31500 INSERT EMERGENCY AIRWAY: CPT

## 2021-01-01 PROCEDURE — 83880 ASSAY OF NATRIURETIC PEPTIDE: CPT | Performed by: INTERNAL MEDICINE

## 2021-01-01 PROCEDURE — 85027 COMPLETE CBC AUTOMATED: CPT | Performed by: NURSE PRACTITIONER

## 2021-01-01 PROCEDURE — 85007 BL SMEAR W/DIFF WBC COUNT: CPT | Performed by: EMERGENCY MEDICINE

## 2021-01-01 PROCEDURE — 86850 RBC ANTIBODY SCREEN: CPT | Performed by: NURSE PRACTITIONER

## 2021-01-01 PROCEDURE — 92950 HEART/LUNG RESUSCITATION CPR: CPT | Performed by: EMERGENCY MEDICINE

## 2021-01-01 PROCEDURE — 85018 HEMOGLOBIN: CPT | Performed by: NURSE PRACTITIONER

## 2021-01-01 PROCEDURE — 5A1945Z RESPIRATORY VENTILATION, 24-96 CONSECUTIVE HOURS: ICD-10-PCS | Performed by: INTERNAL MEDICINE

## 2021-01-01 PROCEDURE — 82805 BLOOD GASES W/O2 SATURATION: CPT | Performed by: EMERGENCY MEDICINE

## 2021-01-01 PROCEDURE — 96361 HYDRATE IV INFUSION ADD-ON: CPT

## 2021-01-01 PROCEDURE — 86901 BLOOD TYPING SEROLOGIC RH(D): CPT | Performed by: NURSE PRACTITIONER

## 2021-01-01 PROCEDURE — 84484 ASSAY OF TROPONIN QUANT: CPT | Performed by: EMERGENCY MEDICINE

## 2021-01-01 PROCEDURE — 94150 VITAL CAPACITY TEST: CPT

## 2021-01-01 PROCEDURE — 80048 BASIC METABOLIC PNL TOTAL CA: CPT | Performed by: NURSE PRACTITIONER

## 2021-01-01 PROCEDURE — 87040 BLOOD CULTURE FOR BACTERIA: CPT | Performed by: INTERNAL MEDICINE

## 2021-01-01 PROCEDURE — 76937 US GUIDE VASCULAR ACCESS: CPT | Performed by: NURSE PRACTITIONER

## 2021-01-01 RX ORDER — HYDRALAZINE HYDROCHLORIDE 20 MG/ML
5 INJECTION INTRAMUSCULAR; INTRAVENOUS ONCE
Status: COMPLETED | OUTPATIENT
Start: 2021-01-01 | End: 2021-01-01

## 2021-01-01 RX ORDER — SODIUM CHLORIDE, SODIUM GLUCONATE, SODIUM ACETATE, POTASSIUM CHLORIDE, MAGNESIUM CHLORIDE, SODIUM PHOSPHATE, DIBASIC, AND POTASSIUM PHOSPHATE .53; .5; .37; .037; .03; .012; .00082 G/100ML; G/100ML; G/100ML; G/100ML; G/100ML; G/100ML; G/100ML
1500 INJECTION, SOLUTION INTRAVENOUS ONCE
Status: COMPLETED | OUTPATIENT
Start: 2021-01-01 | End: 2021-01-01

## 2021-01-01 RX ORDER — ATROPINE SULFATE 0.1 MG/ML
INJECTION, SOLUTION ENDOTRACHEAL; INTRAMUSCULAR; INTRAVENOUS; SUBCUTANEOUS CODE/TRAUMA/SEDATION MEDICATION
Status: COMPLETED | OUTPATIENT
Start: 2021-01-01 | End: 2021-01-01

## 2021-01-01 RX ORDER — LABETALOL 20 MG/4 ML (5 MG/ML) INTRAVENOUS SYRINGE
10 EVERY 6 HOURS PRN
Status: DISCONTINUED | OUTPATIENT
Start: 2021-01-01 | End: 2021-07-07 | Stop reason: HOSPADM

## 2021-01-01 RX ORDER — SODIUM CHLORIDE, SODIUM GLUCONATE, SODIUM ACETATE, POTASSIUM CHLORIDE, MAGNESIUM CHLORIDE, SODIUM PHOSPHATE, DIBASIC, AND POTASSIUM PHOSPHATE .53; .5; .37; .037; .03; .012; .00082 G/100ML; G/100ML; G/100ML; G/100ML; G/100ML; G/100ML; G/100ML
900 INJECTION, SOLUTION INTRAVENOUS ONCE
Status: COMPLETED | OUTPATIENT
Start: 2021-01-01 | End: 2021-01-01

## 2021-01-01 RX ORDER — SODIUM CHLORIDE, SODIUM GLUCONATE, SODIUM ACETATE, POTASSIUM CHLORIDE, MAGNESIUM CHLORIDE, SODIUM PHOSPHATE, DIBASIC, AND POTASSIUM PHOSPHATE .53; .5; .37; .037; .03; .012; .00082 G/100ML; G/100ML; G/100ML; G/100ML; G/100ML; G/100ML; G/100ML
1000 INJECTION, SOLUTION INTRAVENOUS ONCE
Status: COMPLETED | OUTPATIENT
Start: 2021-01-01 | End: 2021-01-01

## 2021-01-01 RX ORDER — PROPOFOL 10 MG/ML
5-50 INJECTION, EMULSION INTRAVENOUS
Status: DISCONTINUED | OUTPATIENT
Start: 2021-01-01 | End: 2021-01-01 | Stop reason: HOSPADM

## 2021-01-01 RX ORDER — DESMOPRESSIN ACETATE 4 UG/ML
2 INJECTION, SOLUTION INTRAVENOUS; SUBCUTANEOUS ONCE
Status: COMPLETED | OUTPATIENT
Start: 2021-01-01 | End: 2021-01-01

## 2021-01-01 RX ORDER — DESMOPRESSIN ACETATE 4 UG/ML
2 INJECTION, SOLUTION INTRAVENOUS; SUBCUTANEOUS EVERY 6 HOURS
Status: DISCONTINUED | OUTPATIENT
Start: 2021-01-01 | End: 2021-01-01

## 2021-01-01 RX ORDER — SODIUM CHLORIDE, SODIUM GLUCONATE, SODIUM ACETATE, POTASSIUM CHLORIDE, MAGNESIUM CHLORIDE, SODIUM PHOSPHATE, DIBASIC, AND POTASSIUM PHOSPHATE .53; .5; .37; .037; .03; .012; .00082 G/100ML; G/100ML; G/100ML; G/100ML; G/100ML; G/100ML; G/100ML
300 INJECTION, SOLUTION INTRAVENOUS ONCE
Status: COMPLETED | OUTPATIENT
Start: 2021-01-01 | End: 2021-01-01

## 2021-01-01 RX ORDER — EPINEPHRINE 1 MG/ML
INJECTION, SOLUTION, CONCENTRATE INTRAVENOUS
Status: DISPENSED
Start: 2021-01-01 | End: 2021-01-01

## 2021-01-01 RX ORDER — EPINEPHRINE 0.1 MG/ML
1 SYRINGE (ML) INJECTION ONCE
Status: COMPLETED | OUTPATIENT
Start: 2021-01-01 | End: 2021-01-01

## 2021-01-01 RX ORDER — FUROSEMIDE 10 MG/ML
40 INJECTION INTRAMUSCULAR; INTRAVENOUS
Status: DISCONTINUED | OUTPATIENT
Start: 2021-01-01 | End: 2021-07-06

## 2021-01-01 RX ORDER — CHLORHEXIDINE GLUCONATE 0.12 MG/ML
15 RINSE ORAL EVERY 12 HOURS SCHEDULED
Status: DISCONTINUED | OUTPATIENT
Start: 2021-01-01 | End: 2021-07-06

## 2021-01-01 RX ORDER — HEPARIN SODIUM 5000 [USP'U]/ML
5000 INJECTION, SOLUTION INTRAVENOUS; SUBCUTANEOUS EVERY 8 HOURS SCHEDULED
Status: DISCONTINUED | OUTPATIENT
Start: 2021-01-01 | End: 2021-07-06

## 2021-01-01 RX ORDER — SODIUM CHLORIDE, SODIUM GLUCONATE, SODIUM ACETATE, POTASSIUM CHLORIDE, MAGNESIUM CHLORIDE, SODIUM PHOSPHATE, DIBASIC, AND POTASSIUM PHOSPHATE .53; .5; .37; .037; .03; .012; .00082 G/100ML; G/100ML; G/100ML; G/100ML; G/100ML; G/100ML; G/100ML
1100 INJECTION, SOLUTION INTRAVENOUS ONCE
Status: COMPLETED | OUTPATIENT
Start: 2021-01-01 | End: 2021-01-01

## 2021-01-01 RX ORDER — ACETAMINOPHEN 160 MG/5ML
650 SUSPENSION, ORAL (FINAL DOSE FORM) ORAL EVERY 4 HOURS PRN
Status: DISCONTINUED | OUTPATIENT
Start: 2021-01-01 | End: 2021-07-06

## 2021-01-01 RX ORDER — DEXTROSE MONOHYDRATE 50 MG/ML
100 INJECTION, SOLUTION INTRAVENOUS CONTINUOUS
Status: DISCONTINUED | OUTPATIENT
Start: 2021-01-01 | End: 2021-07-07 | Stop reason: HOSPADM

## 2021-01-01 RX ORDER — FENTANYL CITRATE 50 UG/ML
100 INJECTION, SOLUTION INTRAMUSCULAR; INTRAVENOUS ONCE
Status: DISCONTINUED | OUTPATIENT
Start: 2021-01-01 | End: 2021-01-01

## 2021-01-01 RX ORDER — POTASSIUM CHLORIDE 20MEQ/15ML
40 LIQUID (ML) ORAL ONCE
Status: COMPLETED | OUTPATIENT
Start: 2021-01-01 | End: 2021-01-01

## 2021-01-01 RX ORDER — SODIUM CHLORIDE, SODIUM GLUCONATE, SODIUM ACETATE, POTASSIUM CHLORIDE, MAGNESIUM CHLORIDE, SODIUM PHOSPHATE, DIBASIC, AND POTASSIUM PHOSPHATE .53; .5; .37; .037; .03; .012; .00082 G/100ML; G/100ML; G/100ML; G/100ML; G/100ML; G/100ML; G/100ML
50 INJECTION, SOLUTION INTRAVENOUS CONTINUOUS
Status: DISCONTINUED | OUTPATIENT
Start: 2021-01-01 | End: 2021-01-01

## 2021-01-01 RX ORDER — INSULIN GLARGINE 100 [IU]/ML
10 INJECTION, SOLUTION SUBCUTANEOUS
Status: DISCONTINUED | OUTPATIENT
Start: 2021-01-01 | End: 2021-01-01

## 2021-01-01 RX ORDER — PANTOPRAZOLE SODIUM 40 MG/1
40 INJECTION, POWDER, FOR SOLUTION INTRAVENOUS EVERY 12 HOURS SCHEDULED
Status: DISCONTINUED | OUTPATIENT
Start: 2021-01-01 | End: 2021-07-06

## 2021-01-01 RX ORDER — SODIUM CHLORIDE, SODIUM GLUCONATE, SODIUM ACETATE, POTASSIUM CHLORIDE, MAGNESIUM CHLORIDE, SODIUM PHOSPHATE, DIBASIC, AND POTASSIUM PHOSPHATE .53; .5; .37; .037; .03; .012; .00082 G/100ML; G/100ML; G/100ML; G/100ML; G/100ML; G/100ML; G/100ML
425 INJECTION, SOLUTION INTRAVENOUS ONCE
Status: COMPLETED | OUTPATIENT
Start: 2021-01-01 | End: 2021-01-01

## 2021-01-01 RX ORDER — PROPOFOL 10 MG/ML
5-50 INJECTION, EMULSION INTRAVENOUS
Status: DISCONTINUED | OUTPATIENT
Start: 2021-01-01 | End: 2021-01-01

## 2021-01-01 RX ORDER — LABETALOL 20 MG/4 ML (5 MG/ML) INTRAVENOUS SYRINGE
10 ONCE
Status: COMPLETED | OUTPATIENT
Start: 2021-01-01 | End: 2021-01-01

## 2021-01-01 RX ORDER — SODIUM CHLORIDE, SODIUM GLUCONATE, SODIUM ACETATE, POTASSIUM CHLORIDE, MAGNESIUM CHLORIDE, SODIUM PHOSPHATE, DIBASIC, AND POTASSIUM PHOSPHATE .53; .5; .37; .037; .03; .012; .00082 G/100ML; G/100ML; G/100ML; G/100ML; G/100ML; G/100ML; G/100ML
850 INJECTION, SOLUTION INTRAVENOUS ONCE
Status: COMPLETED | OUTPATIENT
Start: 2021-01-01 | End: 2021-01-01

## 2021-01-01 RX ORDER — HYDRALAZINE HYDROCHLORIDE 20 MG/ML
10 INJECTION INTRAMUSCULAR; INTRAVENOUS ONCE
Status: COMPLETED | OUTPATIENT
Start: 2021-01-01 | End: 2021-01-01

## 2021-01-01 RX ORDER — FENTANYL CITRATE 50 UG/ML
1 INJECTION, SOLUTION INTRAMUSCULAR; INTRAVENOUS ONCE
Status: COMPLETED | OUTPATIENT
Start: 2021-01-01 | End: 2021-01-01

## 2021-01-01 RX ORDER — HYDRALAZINE HYDROCHLORIDE 20 MG/ML
10 INJECTION INTRAMUSCULAR; INTRAVENOUS EVERY 6 HOURS PRN
Status: DISCONTINUED | OUTPATIENT
Start: 2021-01-01 | End: 2021-01-01

## 2021-01-01 RX ORDER — FUROSEMIDE 10 MG/ML
40 INJECTION INTRAMUSCULAR; INTRAVENOUS ONCE
Status: COMPLETED | OUTPATIENT
Start: 2021-01-01 | End: 2021-01-01

## 2021-01-01 RX ADMIN — CHLORHEXIDINE GLUCONATE 0.12% ORAL RINSE 15 ML: 1.2 LIQUID ORAL at 08:06

## 2021-01-01 RX ADMIN — CEFEPIME HYDROCHLORIDE 2000 MG: 2 INJECTION, POWDER, FOR SOLUTION INTRAVENOUS at 14:41

## 2021-01-01 RX ADMIN — CEFEPIME HYDROCHLORIDE 2000 MG: 2 INJECTION, POWDER, FOR SOLUTION INTRAVENOUS at 14:23

## 2021-01-01 RX ADMIN — GLYCERIN, HYPROMELLOSE, POLYETHYLENE GLYCOL 2 DROP: .2; .2; 1 LIQUID OPHTHALMIC at 13:11

## 2021-01-01 RX ADMIN — SODIUM CHLORIDE, SODIUM GLUCONATE, SODIUM ACETATE, POTASSIUM CHLORIDE, MAGNESIUM CHLORIDE, SODIUM PHOSPHATE, DIBASIC, AND POTASSIUM PHOSPHATE 1000 ML: .53; .5; .37; .037; .03; .012; .00082 INJECTION, SOLUTION INTRAVENOUS at 19:30

## 2021-01-01 RX ADMIN — ACETAMINOPHEN 650 MG: 650 SUSPENSION ORAL at 18:16

## 2021-01-01 RX ADMIN — PANTOPRAZOLE SODIUM 40 MG: 40 INJECTION, POWDER, FOR SOLUTION INTRAVENOUS at 21:09

## 2021-01-01 RX ADMIN — DESMOPRESSIN ACETATE 2 MCG: 4 SOLUTION INTRAVENOUS at 19:46

## 2021-01-01 RX ADMIN — SODIUM CHLORIDE, SODIUM GLUCONATE, SODIUM ACETATE, POTASSIUM CHLORIDE, MAGNESIUM CHLORIDE, SODIUM PHOSPHATE, DIBASIC, AND POTASSIUM PHOSPHATE 900 ML: .53; .5; .37; .037; .03; .012; .00082 INJECTION, SOLUTION INTRAVENOUS at 02:09

## 2021-01-01 RX ADMIN — CHLORHEXIDINE GLUCONATE 0.12% ORAL RINSE 15 ML: 1.2 LIQUID ORAL at 21:09

## 2021-01-01 RX ADMIN — HEPARIN SODIUM 5000 UNITS: 5000 INJECTION INTRAVENOUS; SUBCUTANEOUS at 06:32

## 2021-01-01 RX ADMIN — VASOPRESSIN 0.04 UNITS/MIN: 20 INJECTION INTRAVENOUS at 21:00

## 2021-01-01 RX ADMIN — METRONIDAZOLE 500 MG: 500 INJECTION, SOLUTION INTRAVENOUS at 21:06

## 2021-01-01 RX ADMIN — INSULIN LISPRO 2 UNITS: 100 INJECTION, SOLUTION INTRAVENOUS; SUBCUTANEOUS at 23:52

## 2021-01-01 RX ADMIN — METRONIDAZOLE 500 MG: 500 INJECTION, SOLUTION INTRAVENOUS at 14:08

## 2021-01-01 RX ADMIN — HEPARIN SODIUM 5000 UNITS: 5000 INJECTION INTRAVENOUS; SUBCUTANEOUS at 14:25

## 2021-01-01 RX ADMIN — CHLORHEXIDINE GLUCONATE 0.12% ORAL RINSE 15 ML: 1.2 LIQUID ORAL at 21:12

## 2021-01-01 RX ADMIN — VASOPRESSIN 0.04 UNITS/MIN: 20 INJECTION INTRAVENOUS at 15:00

## 2021-01-01 RX ADMIN — CHLORHEXIDINE GLUCONATE 0.12% ORAL RINSE 15 ML: 1.2 LIQUID ORAL at 08:44

## 2021-01-01 RX ADMIN — SODIUM BICARBONATE 100 ML/HR: 84 INJECTION, SOLUTION INTRAVENOUS at 18:15

## 2021-01-01 RX ADMIN — CHLORHEXIDINE GLUCONATE 0.12% ORAL RINSE 15 ML: 1.2 LIQUID ORAL at 20:47

## 2021-01-01 RX ADMIN — METRONIDAZOLE 500 MG: 500 INJECTION, SOLUTION INTRAVENOUS at 21:47

## 2021-01-01 RX ADMIN — PROPOFOL 30 MCG/KG/MIN: 10 INJECTION, EMULSION INTRAVENOUS at 06:31

## 2021-01-01 RX ADMIN — METRONIDAZOLE 500 MG: 500 INJECTION, SOLUTION INTRAVENOUS at 14:14

## 2021-01-01 RX ADMIN — PANTOPRAZOLE SODIUM 40 MG: 40 INJECTION, POWDER, FOR SOLUTION INTRAVENOUS at 21:12

## 2021-01-01 RX ADMIN — EPINEPHRINE 1 MG: 0.1 INJECTION, SOLUTION ENDOTRACHEAL; INTRACARDIAC; INTRAVENOUS at 14:38

## 2021-01-01 RX ADMIN — NOREPINEPHRINE BITARTRATE 30 MCG/MIN: 1 INJECTION, SOLUTION, CONCENTRATE INTRAVENOUS at 02:42

## 2021-01-01 RX ADMIN — PANTOPRAZOLE SODIUM 40 MG: 40 INJECTION, POWDER, FOR SOLUTION INTRAVENOUS at 09:13

## 2021-01-01 RX ADMIN — CEFEPIME HYDROCHLORIDE 2000 MG: 2 INJECTION, POWDER, FOR SOLUTION INTRAVENOUS at 14:08

## 2021-01-01 RX ADMIN — HYDROCORTISONE SODIUM SUCCINATE 50 MG: 100 INJECTION, POWDER, FOR SOLUTION INTRAMUSCULAR; INTRAVENOUS at 21:12

## 2021-01-01 RX ADMIN — NOREPINEPHRINE BITARTRATE 8 MCG/MIN: 1 INJECTION, SOLUTION, CONCENTRATE INTRAVENOUS at 14:17

## 2021-01-01 RX ADMIN — INSULIN LISPRO 2 UNITS: 100 INJECTION, SOLUTION INTRAVENOUS; SUBCUTANEOUS at 12:33

## 2021-01-01 RX ADMIN — SODIUM CHLORIDE 15 MG/HR: 0.9 INJECTION, SOLUTION INTRAVENOUS at 14:58

## 2021-01-01 RX ADMIN — NOREPINEPHRINE BITARTRATE 30 MCG/MIN: 1 INJECTION, SOLUTION, CONCENTRATE INTRAVENOUS at 21:46

## 2021-01-01 RX ADMIN — NOREPINEPHRINE BITARTRATE 3 MCG/MIN: 1 INJECTION, SOLUTION, CONCENTRATE INTRAVENOUS at 06:04

## 2021-01-01 RX ADMIN — METRONIDAZOLE 500 MG: 500 INJECTION, SOLUTION INTRAVENOUS at 05:01

## 2021-01-01 RX ADMIN — HEPARIN SODIUM 5000 UNITS: 5000 INJECTION INTRAVENOUS; SUBCUTANEOUS at 18:05

## 2021-01-01 RX ADMIN — PANTOPRAZOLE SODIUM 40 MG: 40 INJECTION, POWDER, FOR SOLUTION INTRAVENOUS at 08:57

## 2021-01-01 RX ADMIN — HEPARIN SODIUM 5000 UNITS: 5000 INJECTION INTRAVENOUS; SUBCUTANEOUS at 21:30

## 2021-01-01 RX ADMIN — LEVETIRACETAM 750 MG: 100 INJECTION, SOLUTION INTRAVENOUS at 08:40

## 2021-01-01 RX ADMIN — VASOPRESSIN 0.04 UNITS/MIN: 20 INJECTION INTRAVENOUS at 22:41

## 2021-01-01 RX ADMIN — NOREPINEPHRINE BITARTRATE 30 MCG/MIN: 1 INJECTION, SOLUTION, CONCENTRATE INTRAVENOUS at 07:53

## 2021-01-01 RX ADMIN — NOREPINEPHRINE BITARTRATE 5 MCG/MIN: 1 INJECTION, SOLUTION, CONCENTRATE INTRAVENOUS at 19:42

## 2021-01-01 RX ADMIN — CHLORHEXIDINE GLUCONATE 0.12% ORAL RINSE 15 ML: 1.2 LIQUID ORAL at 08:56

## 2021-01-01 RX ADMIN — SODIUM BICARBONATE 100 ML/HR: 84 INJECTION, SOLUTION INTRAVENOUS at 16:57

## 2021-01-01 RX ADMIN — NOREPINEPHRINE BITARTRATE 10 MCG/MIN: 1 INJECTION, SOLUTION, CONCENTRATE INTRAVENOUS at 23:44

## 2021-01-01 RX ADMIN — LABETALOL 20 MG/4 ML (5 MG/ML) INTRAVENOUS SYRINGE 10 MG: at 15:52

## 2021-01-01 RX ADMIN — SODIUM CHLORIDE, SODIUM GLUCONATE, SODIUM ACETATE, POTASSIUM CHLORIDE, MAGNESIUM CHLORIDE, SODIUM PHOSPHATE, DIBASIC, AND POTASSIUM PHOSPHATE 125 ML/HR: .53; .5; .37; .037; .03; .012; .00082 INJECTION, SOLUTION INTRAVENOUS at 09:00

## 2021-01-01 RX ADMIN — DESMOPRESSIN ACETATE 2 MCG: 4 SOLUTION INTRAVENOUS at 18:00

## 2021-01-01 RX ADMIN — PROPOFOL 25 MCG/KG/MIN: 10 INJECTION, EMULSION INTRAVENOUS at 15:13

## 2021-01-01 RX ADMIN — SODIUM CHLORIDE 2.5 MG/HR: 0.9 INJECTION, SOLUTION INTRAVENOUS at 13:03

## 2021-01-01 RX ADMIN — SODIUM CHLORIDE, SODIUM GLUCONATE, SODIUM ACETATE, POTASSIUM CHLORIDE, MAGNESIUM CHLORIDE, SODIUM PHOSPHATE, DIBASIC, AND POTASSIUM PHOSPHATE 1100 ML: .53; .5; .37; .037; .03; .012; .00082 INJECTION, SOLUTION INTRAVENOUS at 03:14

## 2021-01-01 RX ADMIN — SODIUM CHLORIDE, SODIUM GLUCONATE, SODIUM ACETATE, POTASSIUM CHLORIDE, MAGNESIUM CHLORIDE, SODIUM PHOSPHATE, DIBASIC, AND POTASSIUM PHOSPHATE 1000 ML: .53; .5; .37; .037; .03; .012; .00082 INJECTION, SOLUTION INTRAVENOUS at 17:47

## 2021-01-01 RX ADMIN — HEPARIN SODIUM 5000 UNITS: 5000 INJECTION INTRAVENOUS; SUBCUTANEOUS at 14:08

## 2021-01-01 RX ADMIN — SODIUM CHLORIDE, SODIUM LACTATE, POTASSIUM CHLORIDE, AND CALCIUM CHLORIDE 500 ML: .6; .31; .03; .02 INJECTION, SOLUTION INTRAVENOUS at 19:45

## 2021-01-01 RX ADMIN — VASOPRESSIN 0.04 UNITS/MIN: 20 INJECTION INTRAVENOUS at 02:03

## 2021-01-01 RX ADMIN — NOREPINEPHRINE BITARTRATE 10 MCG/MIN: 1 INJECTION, SOLUTION, CONCENTRATE INTRAVENOUS at 17:09

## 2021-01-01 RX ADMIN — VASOPRESSIN 0.04 UNITS/MIN: 20 INJECTION INTRAVENOUS at 20:03

## 2021-01-01 RX ADMIN — VASOPRESSIN 0.04 UNITS/MIN: 20 INJECTION INTRAVENOUS at 02:12

## 2021-01-01 RX ADMIN — CEFEPIME HYDROCHLORIDE 2000 MG: 2 INJECTION, POWDER, FOR SOLUTION INTRAVENOUS at 02:03

## 2021-01-01 RX ADMIN — DESMOPRESSIN ACETATE 2 MCG: 4 SOLUTION INTRAVENOUS at 04:24

## 2021-01-01 RX ADMIN — LEVETIRACETAM 750 MG: 100 INJECTION, SOLUTION INTRAVENOUS at 20:47

## 2021-01-01 RX ADMIN — HYDROCORTISONE SODIUM SUCCINATE 50 MG: 100 INJECTION, POWDER, FOR SOLUTION INTRAMUSCULAR; INTRAVENOUS at 11:59

## 2021-01-01 RX ADMIN — NOREPINEPHRINE BITARTRATE 30 MCG/MIN: 1 INJECTION, SOLUTION, CONCENTRATE INTRAVENOUS at 21:03

## 2021-01-01 RX ADMIN — PANTOPRAZOLE SODIUM 40 MG: 40 INJECTION, POWDER, FOR SOLUTION INTRAVENOUS at 20:47

## 2021-01-01 RX ADMIN — SODIUM BICARBONATE 50 MEQ: 84 INJECTION, SOLUTION INTRAVENOUS at 15:29

## 2021-01-01 RX ADMIN — NOREPINEPHRINE BITARTRATE 20 MCG/MIN: 1 INJECTION, SOLUTION, CONCENTRATE INTRAVENOUS at 19:00

## 2021-01-01 RX ADMIN — PHENYLEPHRINE HYDROCHLORIDE 200 MCG/MIN: 10 INJECTION INTRAVENOUS at 19:06

## 2021-01-01 RX ADMIN — HEPARIN SODIUM 5000 UNITS: 5000 INJECTION INTRAVENOUS; SUBCUTANEOUS at 06:12

## 2021-01-01 RX ADMIN — FUROSEMIDE 40 MG: 10 INJECTION, SOLUTION INTRAVENOUS at 18:26

## 2021-01-01 RX ADMIN — METRONIDAZOLE 500 MG: 500 INJECTION, SOLUTION INTRAVENOUS at 15:40

## 2021-01-01 RX ADMIN — PHENYLEPHRINE HYDROCHLORIDE 20 MCG/MIN: 10 INJECTION INTRAVENOUS at 18:37

## 2021-01-01 RX ADMIN — HEPARIN SODIUM 5000 UNITS: 5000 INJECTION INTRAVENOUS; SUBCUTANEOUS at 05:01

## 2021-01-01 RX ADMIN — FUROSEMIDE 40 MG: 10 INJECTION, SOLUTION INTRAVENOUS at 10:27

## 2021-01-01 RX ADMIN — VASOPRESSIN 0.04 UNITS/MIN: 20 INJECTION INTRAVENOUS at 17:00

## 2021-01-01 RX ADMIN — SODIUM BICARBONATE 50 MEQ: 84 INJECTION, SOLUTION INTRAVENOUS at 15:28

## 2021-01-01 RX ADMIN — CEFEPIME HYDROCHLORIDE 2000 MG: 2 INJECTION, POWDER, FOR SOLUTION INTRAVENOUS at 02:10

## 2021-01-01 RX ADMIN — PANTOPRAZOLE SODIUM 40 MG: 40 INJECTION, POWDER, FOR SOLUTION INTRAVENOUS at 21:10

## 2021-01-01 RX ADMIN — PHENYLEPHRINE HYDROCHLORIDE 130 MCG/MIN: 10 INJECTION INTRAVENOUS at 14:16

## 2021-01-01 RX ADMIN — HEPARIN SODIUM 5000 UNITS: 5000 INJECTION INTRAVENOUS; SUBCUTANEOUS at 05:45

## 2021-01-01 RX ADMIN — INSULIN LISPRO 2 UNITS: 100 INJECTION, SOLUTION INTRAVENOUS; SUBCUTANEOUS at 18:32

## 2021-01-01 RX ADMIN — SODIUM CHLORIDE, SODIUM LACTATE, POTASSIUM CHLORIDE, AND CALCIUM CHLORIDE 500 ML: .6; .31; .03; .02 INJECTION, SOLUTION INTRAVENOUS at 05:33

## 2021-01-01 RX ADMIN — SODIUM CHLORIDE 1000 ML: 0.9 INJECTION, SOLUTION INTRAVENOUS at 15:19

## 2021-01-01 RX ADMIN — SODIUM CHLORIDE, SODIUM GLUCONATE, SODIUM ACETATE, POTASSIUM CHLORIDE, MAGNESIUM CHLORIDE, SODIUM PHOSPHATE, DIBASIC, AND POTASSIUM PHOSPHATE 1500 ML: .53; .5; .37; .037; .03; .012; .00082 INJECTION, SOLUTION INTRAVENOUS at 04:35

## 2021-01-01 RX ADMIN — HEPARIN SODIUM 5000 UNITS: 5000 INJECTION INTRAVENOUS; SUBCUTANEOUS at 06:48

## 2021-01-01 RX ADMIN — NOREPINEPHRINE BITARTRATE 30 MCG/MIN: 1 INJECTION, SOLUTION, CONCENTRATE INTRAVENOUS at 01:59

## 2021-01-01 RX ADMIN — PANTOPRAZOLE SODIUM 40 MG: 40 INJECTION, POWDER, FOR SOLUTION INTRAVENOUS at 14:35

## 2021-01-01 RX ADMIN — NOREPINEPHRINE BITARTRATE 10 MCG/MIN: 1 INJECTION, SOLUTION, CONCENTRATE INTRAVENOUS at 01:29

## 2021-01-01 RX ADMIN — CHLORHEXIDINE GLUCONATE 0.12% ORAL RINSE 15 ML: 1.2 LIQUID ORAL at 09:13

## 2021-01-01 RX ADMIN — INSULIN LISPRO 2 UNITS: 100 INJECTION, SOLUTION INTRAVENOUS; SUBCUTANEOUS at 12:15

## 2021-01-01 RX ADMIN — LEVETIRACETAM 750 MG: 100 INJECTION, SOLUTION INTRAVENOUS at 21:05

## 2021-01-01 RX ADMIN — CEFEPIME HYDROCHLORIDE 2000 MG: 2 INJECTION, POWDER, FOR SOLUTION INTRAVENOUS at 03:30

## 2021-01-01 RX ADMIN — HYDROCORTISONE SODIUM SUCCINATE 50 MG: 100 INJECTION, POWDER, FOR SOLUTION INTRAMUSCULAR; INTRAVENOUS at 12:37

## 2021-01-01 RX ADMIN — METRONIDAZOLE 500 MG: 500 INJECTION, SOLUTION INTRAVENOUS at 06:26

## 2021-01-01 RX ADMIN — METRONIDAZOLE 500 MG: 500 INJECTION, SOLUTION INTRAVENOUS at 05:40

## 2021-01-01 RX ADMIN — SODIUM CHLORIDE 1000 ML: 0.9 INJECTION, SOLUTION INTRAVENOUS at 13:46

## 2021-01-01 RX ADMIN — NOREPINEPHRINE BITARTRATE 10 MCG/MIN: 1 INJECTION, SOLUTION, CONCENTRATE INTRAVENOUS at 06:32

## 2021-01-01 RX ADMIN — PHENYLEPHRINE HYDROCHLORIDE 100 MCG/MIN: 10 INJECTION INTRAVENOUS at 21:03

## 2021-01-01 RX ADMIN — METRONIDAZOLE 500 MG: 500 INJECTION, SOLUTION INTRAVENOUS at 14:35

## 2021-01-01 RX ADMIN — SODIUM CHLORIDE, SODIUM GLUCONATE, SODIUM ACETATE, POTASSIUM CHLORIDE, MAGNESIUM CHLORIDE, SODIUM PHOSPHATE, DIBASIC, AND POTASSIUM PHOSPHATE 250 ML/HR: .53; .5; .37; .037; .03; .012; .00082 INJECTION, SOLUTION INTRAVENOUS at 07:30

## 2021-01-01 RX ADMIN — ATROPINE SULFATE 1 MG: 0.1 INJECTION, SOLUTION ENDOTRACHEAL; INTRAMUSCULAR; INTRAVENOUS; SUBCUTANEOUS at 14:25

## 2021-01-01 RX ADMIN — HYDROCORTISONE SODIUM SUCCINATE 50 MG: 100 INJECTION, POWDER, FOR SOLUTION INTRAMUSCULAR; INTRAVENOUS at 06:13

## 2021-01-01 RX ADMIN — PHENYLEPHRINE HYDROCHLORIDE 60 MCG/MIN: 10 INJECTION INTRAVENOUS at 16:12

## 2021-01-01 RX ADMIN — NOREPINEPHRINE BITARTRATE 30 MCG/MIN: 1 INJECTION, SOLUTION, CONCENTRATE INTRAVENOUS at 17:13

## 2021-01-01 RX ADMIN — NOREPINEPHRINE BITARTRATE 30 MCG/MIN: 1 INJECTION, SOLUTION, CONCENTRATE INTRAVENOUS at 23:06

## 2021-01-01 RX ADMIN — CHLORHEXIDINE GLUCONATE 0.12% ORAL RINSE 15 ML: 1.2 LIQUID ORAL at 21:08

## 2021-01-01 RX ADMIN — HYDRALAZINE HYDROCHLORIDE 10 MG: 20 INJECTION, SOLUTION INTRAMUSCULAR; INTRAVENOUS at 10:33

## 2021-01-01 RX ADMIN — SODIUM CHLORIDE, SODIUM GLUCONATE, SODIUM ACETATE, POTASSIUM CHLORIDE, MAGNESIUM CHLORIDE, SODIUM PHOSPHATE, DIBASIC, AND POTASSIUM PHOSPHATE 250 ML/HR: .53; .5; .37; .037; .03; .012; .00082 INJECTION, SOLUTION INTRAVENOUS at 10:20

## 2021-01-01 RX ADMIN — NOREPINEPHRINE BITARTRATE 30 MCG/MIN: 1 INJECTION, SOLUTION, CONCENTRATE INTRAVENOUS at 18:55

## 2021-01-01 RX ADMIN — CHLORHEXIDINE GLUCONATE 0.12% ORAL RINSE 15 ML: 1.2 LIQUID ORAL at 21:06

## 2021-01-01 RX ADMIN — LEVETIRACETAM 750 MG: 100 INJECTION, SOLUTION INTRAVENOUS at 21:49

## 2021-01-01 RX ADMIN — HEPARIN SODIUM 5000 UNITS: 5000 INJECTION INTRAVENOUS; SUBCUTANEOUS at 21:06

## 2021-01-01 RX ADMIN — PHENYLEPHRINE HYDROCHLORIDE 200 MCG/MIN: 10 INJECTION INTRAVENOUS at 23:25

## 2021-01-01 RX ADMIN — HEPARIN SODIUM 5000 UNITS: 5000 INJECTION INTRAVENOUS; SUBCUTANEOUS at 21:10

## 2021-01-01 RX ADMIN — LEVETIRACETAM 1500 MG: 100 INJECTION, SOLUTION INTRAVENOUS at 20:33

## 2021-01-01 RX ADMIN — FUROSEMIDE 40 MG: 10 INJECTION, SOLUTION INTRAVENOUS at 10:33

## 2021-01-01 RX ADMIN — SODIUM CHLORIDE, SODIUM GLUCONATE, SODIUM ACETATE, POTASSIUM CHLORIDE, MAGNESIUM CHLORIDE, SODIUM PHOSPHATE, DIBASIC, AND POTASSIUM PHOSPHATE 1000 ML: .53; .5; .37; .037; .03; .012; .00082 INJECTION, SOLUTION INTRAVENOUS at 12:32

## 2021-01-01 RX ADMIN — SODIUM CHLORIDE, SODIUM GLUCONATE, SODIUM ACETATE, POTASSIUM CHLORIDE, MAGNESIUM CHLORIDE, SODIUM PHOSPHATE, DIBASIC, AND POTASSIUM PHOSPHATE 125 ML/HR: .53; .5; .37; .037; .03; .012; .00082 INJECTION, SOLUTION INTRAVENOUS at 15:58

## 2021-01-01 RX ADMIN — NOREPINEPHRINE BITARTRATE 30 MCG/MIN: 1 INJECTION, SOLUTION, CONCENTRATE INTRAVENOUS at 10:17

## 2021-01-01 RX ADMIN — CHLORHEXIDINE GLUCONATE 0.12% ORAL RINSE 15 ML: 1.2 LIQUID ORAL at 21:47

## 2021-01-01 RX ADMIN — NOREPINEPHRINE BITARTRATE 30 MCG/MIN: 1 INJECTION, SOLUTION, CONCENTRATE INTRAVENOUS at 16:21

## 2021-01-01 RX ADMIN — HEPARIN SODIUM 5000 UNITS: 5000 INJECTION INTRAVENOUS; SUBCUTANEOUS at 23:00

## 2021-01-01 RX ADMIN — POTASSIUM CHLORIDE 40 MEQ: 20 SOLUTION ORAL at 06:00

## 2021-01-01 RX ADMIN — NOREPINEPHRINE BITARTRATE 30 MCG/MIN: 1 INJECTION, SOLUTION, CONCENTRATE INTRAVENOUS at 23:25

## 2021-01-01 RX ADMIN — VASOPRESSIN 0.04 UNITS/MIN: 20 INJECTION INTRAVENOUS at 05:24

## 2021-01-01 RX ADMIN — METRONIDAZOLE 500 MG: 500 INJECTION, SOLUTION INTRAVENOUS at 13:45

## 2021-01-01 RX ADMIN — DEXTROSE 100 ML/HR: 5 SOLUTION INTRAVENOUS at 18:36

## 2021-01-01 RX ADMIN — SODIUM CHLORIDE, SODIUM GLUCONATE, SODIUM ACETATE, POTASSIUM CHLORIDE, MAGNESIUM CHLORIDE, SODIUM PHOSPHATE, DIBASIC, AND POTASSIUM PHOSPHATE 850 ML: .53; .5; .37; .037; .03; .012; .00082 INJECTION, SOLUTION INTRAVENOUS at 01:15

## 2021-01-01 RX ADMIN — CEFEPIME HYDROCHLORIDE 2000 MG: 2 INJECTION, POWDER, FOR SOLUTION INTRAVENOUS at 15:21

## 2021-01-01 RX ADMIN — PHENYLEPHRINE HYDROCHLORIDE 200 MCG/MIN: 10 INJECTION INTRAVENOUS at 01:48

## 2021-01-01 RX ADMIN — DESMOPRESSIN ACETATE 2 MCG: 4 SOLUTION INTRAVENOUS at 00:45

## 2021-01-01 RX ADMIN — PROPOFOL 30 MCG/KG/MIN: 10 INJECTION, EMULSION INTRAVENOUS at 00:10

## 2021-01-01 RX ADMIN — VASOPRESSIN 0.04 UNITS/MIN: 20 INJECTION INTRAVENOUS at 11:20

## 2021-01-01 RX ADMIN — LEVETIRACETAM 750 MG: 100 INJECTION, SOLUTION INTRAVENOUS at 21:09

## 2021-01-01 RX ADMIN — NOREPINEPHRINE BITARTRATE 4 MCG/MIN: 1 INJECTION, SOLUTION, CONCENTRATE INTRAVENOUS at 09:32

## 2021-01-01 RX ADMIN — PANTOPRAZOLE SODIUM 40 MG: 40 INJECTION, POWDER, FOR SOLUTION INTRAVENOUS at 08:43

## 2021-01-01 RX ADMIN — HEPARIN SODIUM 5000 UNITS: 5000 INJECTION INTRAVENOUS; SUBCUTANEOUS at 13:45

## 2021-01-01 RX ADMIN — HEPARIN SODIUM 5000 UNITS: 5000 INJECTION INTRAVENOUS; SUBCUTANEOUS at 21:12

## 2021-01-01 RX ADMIN — SODIUM CHLORIDE, SODIUM GLUCONATE, SODIUM ACETATE, POTASSIUM CHLORIDE, MAGNESIUM CHLORIDE, SODIUM PHOSPHATE, DIBASIC, AND POTASSIUM PHOSPHATE 1000 ML: .53; .5; .37; .037; .03; .012; .00082 INJECTION, SOLUTION INTRAVENOUS at 16:45

## 2021-01-01 RX ADMIN — LEVETIRACETAM 750 MG: 100 INJECTION, SOLUTION INTRAVENOUS at 08:57

## 2021-01-01 RX ADMIN — HEPARIN SODIUM 5000 UNITS: 5000 INJECTION INTRAVENOUS; SUBCUTANEOUS at 05:40

## 2021-01-01 RX ADMIN — INSULIN LISPRO 1 UNITS: 100 INJECTION, SOLUTION INTRAVENOUS; SUBCUTANEOUS at 06:31

## 2021-01-01 RX ADMIN — PHENYLEPHRINE HYDROCHLORIDE 150 MCG/MIN: 10 INJECTION INTRAVENOUS at 20:55

## 2021-01-01 RX ADMIN — PANTOPRAZOLE SODIUM 40 MG: 40 INJECTION, POWDER, FOR SOLUTION INTRAVENOUS at 08:32

## 2021-01-01 RX ADMIN — HYDROCORTISONE SODIUM SUCCINATE 50 MG: 100 INJECTION, POWDER, FOR SOLUTION INTRAMUSCULAR; INTRAVENOUS at 05:40

## 2021-01-01 RX ADMIN — CEFEPIME HYDROCHLORIDE 2000 MG: 2 INJECTION, POWDER, FOR SOLUTION INTRAVENOUS at 02:12

## 2021-01-01 RX ADMIN — SODIUM CHLORIDE, SODIUM GLUCONATE, SODIUM ACETATE, POTASSIUM CHLORIDE, MAGNESIUM CHLORIDE, SODIUM PHOSPHATE, DIBASIC, AND POTASSIUM PHOSPHATE 300 ML: .53; .5; .37; .037; .03; .012; .00082 INJECTION, SOLUTION INTRAVENOUS at 06:30

## 2021-01-01 RX ADMIN — NOREPINEPHRINE BITARTRATE 5 MCG/MIN: 1 INJECTION, SOLUTION, CONCENTRATE INTRAVENOUS at 19:15

## 2021-01-01 RX ADMIN — VASOPRESSIN 0.04 UNITS/MIN: 20 INJECTION INTRAVENOUS at 14:14

## 2021-01-01 RX ADMIN — NOREPINEPHRINE BITARTRATE 14 MCG/MIN: 1 INJECTION, SOLUTION, CONCENTRATE INTRAVENOUS at 23:27

## 2021-01-01 RX ADMIN — HYDRALAZINE HYDROCHLORIDE 10 MG: 20 INJECTION, SOLUTION INTRAMUSCULAR; INTRAVENOUS at 11:22

## 2021-01-01 RX ADMIN — DESMOPRESSIN ACETATE 2 MCG: 4 SOLUTION INTRAVENOUS at 12:12

## 2021-01-01 RX ADMIN — METRONIDAZOLE 500 MG: 500 INJECTION, SOLUTION INTRAVENOUS at 21:36

## 2021-01-01 RX ADMIN — LEVETIRACETAM 750 MG: 100 INJECTION, SOLUTION INTRAVENOUS at 09:13

## 2021-01-01 RX ADMIN — NOREPINEPHRINE BITARTRATE 30 MCG/MIN: 1 INJECTION, SOLUTION, CONCENTRATE INTRAVENOUS at 12:38

## 2021-01-01 RX ADMIN — HYDROCORTISONE SODIUM SUCCINATE 50 MG: 100 INJECTION, POWDER, FOR SOLUTION INTRAMUSCULAR; INTRAVENOUS at 18:14

## 2021-01-01 RX ADMIN — HEPARIN SODIUM 5000 UNITS: 5000 INJECTION INTRAVENOUS; SUBCUTANEOUS at 14:42

## 2021-01-01 RX ADMIN — CHLORHEXIDINE GLUCONATE 0.12% ORAL RINSE 15 ML: 1.2 LIQUID ORAL at 08:41

## 2021-01-01 RX ADMIN — DEXTROSE 100 ML/HR: 5 SOLUTION INTRAVENOUS at 08:33

## 2021-01-01 RX ADMIN — LEVETIRACETAM 750 MG: 100 INJECTION, SOLUTION INTRAVENOUS at 08:06

## 2021-01-01 RX ADMIN — METRONIDAZOLE 500 MG: 500 INJECTION, SOLUTION INTRAVENOUS at 21:09

## 2021-01-01 RX ADMIN — NOREPINEPHRINE BITARTRATE 30 MCG/MIN: 1 INJECTION, SOLUTION, CONCENTRATE INTRAVENOUS at 19:33

## 2021-01-01 RX ADMIN — ACETAMINOPHEN 650 MG: 650 SUSPENSION ORAL at 06:31

## 2021-01-01 RX ADMIN — PHENYLEPHRINE HYDROCHLORIDE 170 MCG/MIN: 10 INJECTION INTRAVENOUS at 08:42

## 2021-01-01 RX ADMIN — LEVETIRACETAM 750 MG: 100 INJECTION, SOLUTION INTRAVENOUS at 21:06

## 2021-01-01 RX ADMIN — SODIUM CHLORIDE, SODIUM GLUCONATE, SODIUM ACETATE, POTASSIUM CHLORIDE, MAGNESIUM CHLORIDE, SODIUM PHOSPHATE, DIBASIC, AND POTASSIUM PHOSPHATE 425 ML: .53; .5; .37; .037; .03; .012; .00082 INJECTION, SOLUTION INTRAVENOUS at 05:44

## 2021-01-01 RX ADMIN — CHLORHEXIDINE GLUCONATE 0.12% ORAL RINSE 15 ML: 1.2 LIQUID ORAL at 20:36

## 2021-01-01 RX ADMIN — EPINEPHRINE 2 MCG/MIN: 1 INJECTION, SOLUTION, CONCENTRATE INTRAVENOUS at 05:35

## 2021-01-01 RX ADMIN — METRONIDAZOLE 500 MG: 500 INJECTION, SOLUTION INTRAVENOUS at 21:29

## 2021-01-01 RX ADMIN — PHENYLEPHRINE HYDROCHLORIDE 200 MCG/MIN: 10 INJECTION INTRAVENOUS at 10:17

## 2021-01-01 RX ADMIN — HYDROCORTISONE SODIUM SUCCINATE 50 MG: 100 INJECTION, POWDER, FOR SOLUTION INTRAMUSCULAR; INTRAVENOUS at 23:44

## 2021-01-01 RX ADMIN — CHLORHEXIDINE GLUCONATE 0.12% ORAL RINSE 15 ML: 1.2 LIQUID ORAL at 08:32

## 2021-01-01 RX ADMIN — LEVETIRACETAM 750 MG: 100 INJECTION, SOLUTION INTRAVENOUS at 08:43

## 2021-01-01 RX ADMIN — HYDROCORTISONE SODIUM SUCCINATE 50 MG: 100 INJECTION, POWDER, FOR SOLUTION INTRAMUSCULAR; INTRAVENOUS at 18:31

## 2021-01-01 RX ADMIN — METRONIDAZOLE 500 MG: 500 INJECTION, SOLUTION INTRAVENOUS at 21:30

## 2021-01-01 RX ADMIN — HEPARIN SODIUM 5000 UNITS: 5000 INJECTION INTRAVENOUS; SUBCUTANEOUS at 14:35

## 2021-01-01 RX ADMIN — HYDROCORTISONE SODIUM SUCCINATE 50 MG: 100 INJECTION, POWDER, FOR SOLUTION INTRAMUSCULAR; INTRAVENOUS at 23:52

## 2021-01-01 RX ADMIN — NOREPINEPHRINE BITARTRATE 20 MCG/MIN: 1 INJECTION, SOLUTION, CONCENTRATE INTRAVENOUS at 05:24

## 2021-01-01 RX ADMIN — NOREPINEPHRINE BITARTRATE 30 MCG/MIN: 1 INJECTION, SOLUTION, CONCENTRATE INTRAVENOUS at 06:53

## 2021-01-01 RX ADMIN — VASOPRESSIN 0.04 UNITS/MIN: 20 INJECTION INTRAVENOUS at 07:32

## 2021-01-01 RX ADMIN — NOREPINEPHRINE BITARTRATE 10 MCG/MIN: 1 INJECTION, SOLUTION, CONCENTRATE INTRAVENOUS at 11:14

## 2021-01-01 RX ADMIN — HEPARIN SODIUM 5000 UNITS: 5000 INJECTION INTRAVENOUS; SUBCUTANEOUS at 21:48

## 2021-01-01 RX ADMIN — CEFEPIME HYDROCHLORIDE 2000 MG: 2 INJECTION, POWDER, FOR SOLUTION INTRAVENOUS at 03:27

## 2021-01-01 RX ADMIN — NOREPINEPHRINE BITARTRATE 30 MCG/MIN: 1 INJECTION, SOLUTION, CONCENTRATE INTRAVENOUS at 03:34

## 2021-01-01 RX ADMIN — PANTOPRAZOLE SODIUM 40 MG: 40 INJECTION, POWDER, FOR SOLUTION INTRAVENOUS at 21:47

## 2021-01-01 RX ADMIN — HYDROCORTISONE SODIUM SUCCINATE 50 MG: 100 INJECTION, POWDER, FOR SOLUTION INTRAMUSCULAR; INTRAVENOUS at 21:10

## 2021-01-01 RX ADMIN — NOREPINEPHRINE BITARTRATE 30 MCG/MIN: 1 INJECTION, SOLUTION, CONCENTRATE INTRAVENOUS at 14:59

## 2021-01-01 RX ADMIN — PHENYLEPHRINE HYDROCHLORIDE 200 MCG/MIN: 10 INJECTION INTRAVENOUS at 15:01

## 2021-01-01 RX ADMIN — CEFEPIME HYDROCHLORIDE 2000 MG: 2 INJECTION, POWDER, FOR SOLUTION INTRAVENOUS at 15:41

## 2021-01-01 RX ADMIN — PANTOPRAZOLE SODIUM 40 MG: 40 INJECTION, POWDER, FOR SOLUTION INTRAVENOUS at 08:06

## 2021-01-01 RX ADMIN — METRONIDAZOLE 500 MG: 500 INJECTION, SOLUTION INTRAVENOUS at 06:59

## 2021-01-01 RX ADMIN — METRONIDAZOLE 500 MG: 500 INJECTION, SOLUTION INTRAVENOUS at 05:45

## 2021-01-01 RX ADMIN — SODIUM CHLORIDE 12.5 MG/HR: 0.9 INJECTION, SOLUTION INTRAVENOUS at 17:48

## 2021-01-01 RX ADMIN — HYDRALAZINE HYDROCHLORIDE 5 MG: 20 INJECTION, SOLUTION INTRAMUSCULAR; INTRAVENOUS at 07:54

## 2021-01-01 RX ADMIN — HYDROCORTISONE SODIUM SUCCINATE 50 MG: 100 INJECTION, POWDER, FOR SOLUTION INTRAMUSCULAR; INTRAVENOUS at 08:32

## 2021-01-01 RX ADMIN — LEVETIRACETAM 750 MG: 100 INJECTION, SOLUTION INTRAVENOUS at 09:08

## 2021-01-01 RX ADMIN — LEVETIRACETAM 750 MG: 100 INJECTION, SOLUTION INTRAVENOUS at 21:08

## 2021-01-01 RX ADMIN — METRONIDAZOLE 500 MG: 500 INJECTION, SOLUTION INTRAVENOUS at 14:41

## 2021-06-29 PROBLEM — I10 HTN (HYPERTENSION): Status: ACTIVE | Noted: 2021-01-01

## 2021-06-29 PROBLEM — G93.40 ENCEPHALOPATHY ACUTE: Status: ACTIVE | Noted: 2021-01-01

## 2021-06-29 PROBLEM — I46.9 CARDIAC ARREST (HCC): Status: ACTIVE | Noted: 2021-01-01

## 2021-06-29 PROBLEM — G25.3 MYOCLONUS: Status: ACTIVE | Noted: 2021-01-01

## 2021-06-29 PROBLEM — N17.0 ATN (ACUTE TUBULAR NECROSIS) (HCC): Status: ACTIVE | Noted: 2021-01-01

## 2021-06-29 PROBLEM — R74.01 TRANSAMINITIS: Status: ACTIVE | Noted: 2021-01-01

## 2021-06-29 PROBLEM — G93.1 ANOXIC BRAIN INJURY (HCC): Status: ACTIVE | Noted: 2021-01-01

## 2021-06-29 PROBLEM — E87.2 LACTIC ACIDOSIS: Status: ACTIVE | Noted: 2021-01-01

## 2021-06-29 PROBLEM — E87.2 METABOLIC ACIDOSIS: Status: ACTIVE | Noted: 2021-01-01

## 2021-06-29 PROBLEM — T17.908A ASPIRATION INTO RESPIRATORY TRACT: Status: ACTIVE | Noted: 2021-01-01

## 2021-06-29 NOTE — ED PROCEDURE NOTE
Procedure  Intubation    Date/Time: 6/29/2021 2:11 PM  Performed by: Scott Osorio PA-C  Authorized by: Scott Osorio PA-C     Patient location:  ED  Other Assisting Provider: Yes (comment) (Dr Julia Owen)    Consent:     Consent obtained:  Emergent situation  Universal protocol:     Radiology Images displayed and confirmed  If images not available, report reviewed: yes      Required blood products, implants, devices, and special equipment available: yes      Site/side marked: yes      Immediately prior to procedure, a time out was called: yes      Patient identity confirmed: Anonymous protocol, patient vented/unresponsive  Pre-procedure details:     Patient status:  Unresponsive    Mallampati score:  2    Pretreatment medications:  None    Paralytics:  None  Indications:     Indications for intubation: airway protection and hypoxemia      Indications for intubation comment:  Cardiac arrest  Procedure details:     Preoxygenation:  AR/LMA    CPR in progress: no      Intubation method:  Oral    Oral intubation technique:  Glidescope    Tube size (mm):  7 5    Tube type:  Cuffed    Number of attempts:  1    Tube visualized through cords: yes    Placement assessment:     ETT to lip:  22    Tube secured with:  ETT calvin    Breath sounds:  Equal    Placement verification: chest rise, CXR verification, equal breath sounds, ETCO2 detector and fiberoptic scope      CXR findings:  ETT in proper place  Post-procedure details:     Patient tolerance of procedure:   Tolerated well, no immediate complications                     Scott Osorio PA-C  06/29/21 1436       Scott Osorio PA-C  06/29/21 1440

## 2021-06-29 NOTE — ED NOTES
Per provider pt does not need to be on Gerber hugger at this time due to Shine Carmona [id number] and pt's temp being in temp management range for [facility]C Marlyse Holstein, RN  06/29/21 9615

## 2021-06-29 NOTE — ED PROVIDER NOTES
History  Chief Complaint   Patient presents with    Cardiac Arrest     pt arrived 66,     Unknown aged male presents in cardiac arrest   EMS reports that he was found on the bottom of a pool at a nearby apartment complex  He reportedly has a  and was doing breathing practice is on the bottom of the pool  EMS states that initially he was found to be asystolic  A Esequiel airway was placed with copious amounts of water being evacuated from his lungs  CPR was initiated with a Chestine Fend device and a total of three rounds of epinephrine were given EN route to the hospital   The patient's cardiac rhythm started in asystole and proceeded to PE a  On pulling in to the hospital property, the patient had a more organized rhythm but still had weak pulses  CPR was continued for several more minutes  On re-evaluation the patient was found to have return of spontaneous circulation with a rhythm on the monitor that resembled atrial flutter  Bedside echo was performed by myself with good cardiac motion and no signs of a pericardial effusion  Patient was hyper oxygenated and the [facility] airway was removed with an ET tube being placed under my direct supervision  Shortly after the tube was placed, the patient monika down  He then recovered with a normal sinus rhythm and a rate of approximately 60 beats per minute  Patient's oxygen saturation at that time was 100%  There are no external signs of trauma and and bystanders on scene states that he was not diving into the pool  Cardiac Arrest  Incident location: swimming pool    Condition upon EMS arrival:  Unresponsive  Pulse:  Absent  Initial cardiac rhythm per EMS:  Asystole  Treatments prior to arrival:  ACLS protocol and vascular access  Medications given prior to ED:  Epinephrine (x 3)  IV access type:  Peripheral and intraosseous  Airway:  Bag valve mask and intubation in ED  Rhythm on admission to ED:  Atrial flutter      None       No past medical history on file     No past surgical history on file  No family history on file  I have reviewed and agree with the history as documented  No existing history information found  No existing history information found  Social History     Tobacco Use    Smoking status: Not on file   Substance Use Topics    Alcohol use: Not on file    Drug use: Not on file       Review of Systems   Unable to perform ROS: Patient unresponsive       Physical Exam  Physical Exam  Vitals and nursing note reviewed  Constitutional:       General: He is in acute distress  Interventions: He is intubated  Comments: Unresponsive   HENT:      Head: Normocephalic and atraumatic  Right Ear: External ear normal       Left Ear: External ear normal       Nose: Nose normal       Mouth/Throat:      Mouth: Mucous membranes are moist       Pharynx: Oropharynx is clear  Eyes:      General: No scleral icterus  Conjunctiva/sclera: Conjunctivae normal       Comments: Pupils for 5 mm bilaterally and sluggishly reactive   Cardiovascular:      Pulses: Normal pulses  Comments: Pulses noted with compressions    After ROSC - normal rate/rhythm  Pulmonary:      Effort: He is intubated  Comments: Coarse breath sounds  Abdominal:      General: Abdomen is flat  Bowel sounds are normal    Musculoskeletal:         General: No deformity or signs of injury  Cervical back: Neck supple  Skin:     General: Skin is warm  Capillary Refill: Capillary refill takes less than 2 seconds  Neurological:      Mental Status: He is unresponsive  GCS: GCS eye subscore is 1  GCS verbal subscore is 1  GCS motor subscore is 1     Psychiatric:      Comments: Unresponsive         Vital Signs  ED Triage Vitals   Temperature Pulse Respirations Blood Pressure SpO2   06/29/21 1346 06/29/21 1347 06/29/21 1346 06/29/21 1346 06/29/21 1359   (!) 93 5 °F (34 2 °C) 76 14 (!) 90/49 99 %      Temp Source Heart Rate Source Patient Position - Orthostatic VS BP Location FiO2 (%)   06/29/21 1346 06/29/21 1359 06/29/21 1520 06/29/21 1346 06/29/21 1520   Tympanic Monitor Lying Left arm 100      Pain Score       --                  Vitals:    06/29/21 1506 06/29/21 1520 06/29/21 1531 06/29/21 1537   BP: (!) 174/94 (!) 187/105 (!) 184/101 160/93   Pulse: (!) 108 105 101 102   Patient Position - Orthostatic VS:  Lying Lying Lying         Visual Acuity      ED Medications  Medications   propofol (DIPRIVAN) 1000 mg in 100 mL infusion (premix) (35 mcg/kg/min × 88 kg Intravenous Rate/Dose Change 6/29/21 1536)   norepinephrine (LEVOPHED) 4 mg (STANDARD CONCENTRATION) IV in sodium chloride 0 9% 250 mL (has no administration in time range)   sodium bicarbonate 150 mEq in dextrose 5 % 1,000 mL infusion (has no administration in time range)   sodium chloride 0 9 % bolus 1,000 mL (0 mL Intravenous Stopped 6/29/21 1440)   atropine 1 mg/10 mL injection (1 mg Intravenous Given 6/29/21 1425)   EPINEPHrine (ADRENALIN) injection 1 mg (1 mg Intravenous Given 6/29/21 1438)   sodium chloride 0 9 % bolus 1,000 mL (1,000 mL Intravenous New Bag 6/29/21 1519)   sodium bicarbonate 8 4 % injection 50 mEq (50 mEq Intravenous Given 6/29/21 1529)   sodium bicarbonate 8 4 % injection 50 mEq (50 mEq Intravenous Given 6/29/21 1528)       Diagnostic Studies  Results Reviewed     Procedure Component Value Units Date/Time    Blood gas, arterial [[id number]] Collected: 06/29/21 1623    Lab Status:  In process Specimen: Blood, Arterial from Radial, Left Updated: 06/29/21 1630    Troponin I [[id number]]  (Abnormal) Collected: 06/29/21 1548    Lab Status: Final result Specimen: Blood from Arm, Left Updated: 06/29/21 1625     Troponin I 0 23 ng/mL     Narrative:      Hemolysis    Comprehensive metabolic panel [[id number]]  (Abnormal) Collected: 06/29/21 1549    Lab Status: Final result Specimen: Blood from Arm, Right Updated: 06/29/21 1614     Sodium 133 mmol/L      Potassium 4 8 mmol/L      Chloride 101 mmol/L CO2 15 mmol/L      ANION GAP 17 mmol/L      BUN 14 mg/dL      Creatinine 1 48 mg/dL      Glucose 244 mg/dL      Calcium 8 3 mg/dL      Corrected Calcium 8 8 mg/dL       U/L       U/L      Alkaline Phosphatase 106 U/L      Total Protein 6 1 g/dL      Albumin 3 4 g/dL      Total Bilirubin 1 44 mg/dL      eGFR 54 ml/min/1 73sq m     Narrative:      Hemolysis  National Kidney Disease Foundation guidelines for Chronic Kidney Disease (CKD):     Stage 1 with normal or high GFR (GFR > 90 mL/min/1 73 square meters)    Stage 2 Mild CKD (GFR = 60-89 mL/min/1 73 square meters)    Stage 3A Moderate CKD (GFR = 45-59 mL/min/1 73 square meters)    Stage 3B Moderate CKD (GFR = 30-44 mL/min/1 73 square meters)    Stage 4 Severe CKD (GFR = 15-29 mL/min/1 73 square meters)    Stage 5 End Stage CKD (GFR <15 mL/min/1 73 square meters)  Note: GFR calculation is accurate only with a steady state creatinine    Ethanol [[id number]]  (Normal) Collected: 06/29/21 1548    Lab Status: Final result Specimen: Blood from Arm, Left Updated: 06/29/21 1609     Ethanol Lvl <10 mg/dL     UA (URINE) with reflex to Scope [[id number]]  (Abnormal) Collected: 06/29/21 1535    Lab Status: Final result Specimen: Urine, Straight Cath Updated: 06/29/21 1603     Color, UA Red     Clarity, UA Bloody     Specific Gravity, UA 1 015     pH, UA 7 0     Leukocytes, UA Negative     Nitrite, UA Negative     Protein, UA >=500 mg/dl      Glucose,  (1/10%) mg/dl      Ketones, UA 5 (Trace) mg/dl      Bilirubin, UA Negative     Blood,  0     UROBILINOGEN UA Negative mg/dL     Urine Microscopic [[id number]] Collected: 06/29/21 1535    Lab Status: In process Specimen: Urine, Straight Cath Updated: 06/29/21 1601    Rapid drug screen, urine [[id number]] Collected: 06/29/21 1535    Lab Status:  In process Specimen: Urine, Catheter Updated: 06/29/21 1540    Novel Coronavirus (Covid-19),PCR [facility] - 2 Hour Stat [[id number]] Collected: 06/29/21 1534    Lab Status: In process Specimen: Nares from Nose Updated: 06/29/21 1539    Protime-INR [[id number]]  (Abnormal) Collected: 06/29/21 1417    Lab Status: Final result Specimen: Blood from Arm, Left Updated: 06/29/21 1531     Protime 24 0 seconds      INR 2 14    APTT [[id number]]  (Abnormal) Collected: 06/29/21 1417    Lab Status: Final result Specimen: Blood from Arm, Left Updated: 06/29/21 1531     PTT 51 seconds     Blood gas, arterial [[id number]]  (Abnormal) Collected: 06/29/21 1511    Lab Status: Final result Specimen: Blood, Arterial from Radial, Right Updated: 06/29/21 1520     pH, Arterial <6 800     pCO2, Arterial 38 0 mm Hg      pO2, Arterial 542 0 mm Hg      HCO3, Arterial --     Base Excess, Arterial --     O2 Content, Arterial 18 9 mL/dL      O2 HGB,Arterial  95 9 %      SOURCE Radial, Right     HAMILTON TEST Yes     VENT- PS PS     PS PEEP 8     PS VENT FIO2 100     PS CM H2O 8    Manual Differential (Non Wam) [[id number]]  (Abnormal) Collected: 06/29/21 1417    Lab Status: Final result Specimen: Blood from Arm, Left Updated: 06/29/21 1458     Segmented % 7 %      Lymphocytes % 89 %      Monocytes % 4 %      Neutrophils Absolute 0 29 Thousand/uL      Lymphocytes Absolute 3 65 Thousand/uL      Monocytes Absolute 0 16 Thousand/uL      Total Counted 100     nRBC 2 /100 WBC      RBC Morphology abnormal     Macrocytes Present     Platelet Estimate Adequate    Lactic acid [[id number]]  (Abnormal) Collected: 06/29/21 1417    Lab Status: Final result Specimen: Blood from Arm, Left Updated: 06/29/21 1443     LACTIC ACID 23 0 mmol/L     Narrative:      Result may be elevated if tourniquet was used during collection  Result may be elevated if tourniquet was used during collection      Lactic acid 2 Hours [[id number]]     Lab Status: No result Specimen: Blood     CBC and differential [[id number]]  (Abnormal) Collected: 06/29/21 1417    Lab Status: Final result Specimen: Blood from Arm, Left Updated: 06/29/21 1426     WBC 4 10 Thousand/uL      RBC 3 80 Million/uL      Hemoglobin 12 3 g/dL      Hematocrit 41 0 %       fL      MCH 32 4 pg      MCHC 30 1 g/dL      RDW 13 8 %      MPV 8 9 fL      Platelets 330 Thousands/uL                  CT head without contrast   Final Result by Tarsha Meza MD (06/29 1537)      No acute intracranial abnormality  Workstation performed: [id number]         CT spine cervical without contrast   Final Result by Leti Palmer MD (06/29 1546)      No cervical spine fracture or traumatic malalignment  Workstation performed: [id number]         XR chest 1 view portable    (Results Pending)              Procedures  ECG 12 Lead Documentation Only    Date/Time: 6/29/2021 2:15 PM  Performed by: Elva Montague DO  Authorized by: Elva Montague DO     ECG reviewed by me, the ED Provider: yes    Patient location:  ED  Rate:     ECG rate:  59    ECG rate assessment: bradycardic    Rhythm:     Rhythm: sinus bradycardia    Ectopy:     Ectopy: none    Conduction:     Conduction: abnormal    ST segments:     ST segments:  Normal  T waves:     T waves: non-specific    POC Cardiac US    Date/Time: 6/29/2021 2:22 PM  Performed by: Elva Montague DO  Authorized by:  Elva Montague DO     Patient location:  ED  Other Assisting Provider: No    Procedure details:     Exam Type:  Diagnostic    Indications: cardiac arrest      Assessment / Evaluation for: cardiac function and pericardial effusion      Exam Type: initial exam      Image quality: diagnostic      Image availability:  Not obtained due to urgency  Patient Details:     Cardiac Rhythm:  Regular    Mechanical ventilation: Yes    Cardiac findings:     Echo technique: limited 2D      Views obtained: parasternal long axis and parasternal short axis      Pericardial effusion: absent      Tamponade physiology: absent      Wall motion: normal      LV systolic function: normal    ECG 12 Lead Documentation Only    Date/Time: 6/29/2021 3:15 PM  Performed by: Mercedes Hansen DO  Authorized by: Mercedes Hansen DO     ECG reviewed by me, the ED Provider: yes    Patient location:  ED  Rate:     ECG rate:  108    ECG rate assessment: tachycardic    Rhythm:     Rhythm: sinus tachycardia    Ectopy:     Ectopy: none    ST segments:     ST segments:  Abnormal    Elevation:  AVR    Depression:  V3, V4, V5 and V6  CriticalCare Time  Performed by: Mercedes Hansen DO  Authorized by: Mercedes Hansen DO     Critical care provider statement:     Critical care time (minutes):  90    Critical care time was exclusive of:  Separately billable procedures and treating other patients    Critical care was necessary to treat or prevent imminent or life-threatening deterioration of the following conditions:  Respiratory failure, circulatory failure and cardiac failure    Critical care was time spent personally by me on the following activities:  Blood draw for specimens, obtaining history from patient or surrogate, discussions with consultants, evaluation of patient's response to treatment, examination of patient, ventilator management, re-evaluation of patient's condition, ordering and review of radiographic studies, ordering and review of laboratory studies and ordering and performing treatments and interventions    I assumed direction of critical care for this patient from another provider in my specialty: no               ED Course  ED Course as of Jun 29 1633   Tue Jun 29, 2021   1419 Patient reassessed  Continues to have strong pulses is unresponsive  NG tube is placed with copious amounts of water being returned  Awaiting callback from PACs  1428 Discussed with PACs  Critical care is requesting CT scans be done prior to accepting transfer based on the potential for a diving accident  1428 Patient has continued to [place] down from 61 now into the 45s  His blood pressure is also been trending downward  1 mg of atropine was given with no change    Preparing an epi drip now  1444 Patient's blood pressure and heart rate are normalizing with the dirty epi drip  Still waiting on the Levophed to be sent from pharmacy  Patient is currently being transported to [address] Patient returned from CT and at this time is hypertensive and tachycardic  The 30 epi drip had been cut in half  Will be discontinued at this time  Patient is starting to have some effort to breathe against event  Will initiate the propofol drip  1511 Patient has begun to flutter his eyelids and make some small movements with both of his hands and feet  ST segments appeared to change slightly on the cardiac monitor  A repeat EKG was obtained which shows elevation in AVR with depressions laterally  A right-sided EKG is being obtained at this time  1527 Blood gas noted  Will give two amps of bicarb at this time  Retana was placed showing red Yousif-Aid like urine  1547 Patient's labs again were found to be hemolyzed despite a new adequate line being placed for the redraw  Will attempt again  [address]  with Dr Lashawn Canales who accepts the transfer  Recommends attempting to see if the OR might have an i-STAT to run a chemistry  [address] remained stable  Presumptively ordering a bicarb drip as we await the second ABG  Transport is scheduled to be here in about 10-15 minutes  Signed out to Dr Powell to follow up bicarb  We have still been unable to get in touch with any family members of the patient                                                MDM  Number of Diagnoses or Management Options  Cardiac arrest [facility]): new and requires workup     Amount and/or Complexity of Data Reviewed  Clinical lab tests: ordered and reviewed  Tests in the radiology section of CPT®: ordered and reviewed  Tests in the medicine section of CPT®: ordered and reviewed  Decide to obtain previous medical records or to obtain history from someone other than the patient: yes  Obtain history from someone other than the patient: yes  Discuss the patient with other providers: yes  Independent visualization of images, tracings, or specimens: yes    Risk of Complications, Morbidity, and/or Mortality  Presenting problems: high  Diagnostic procedures: high  Management options: high    Patient Progress  Patient progress: improved (  )      Disposition  Final diagnoses:   Cardiac arrest [facility])     Time reflects when diagnosis was documented in both MDM as applicable and the Disposition within this note     Time User Action Codes Description Comment    6/29/2021  2:21 PM Starr Martinez Add [I46 9] Cardiac arrest [facility])       ED Disposition     ED Disposition Condition Date/Time Comment    Transfer to Another [address] Jun 29, 2021  2:21 PM [facility] [place] [facility] should be transferred out to [facility]          MD Documentation      Most Recent Value   Patient Condition  The patient has been stabilized such that within reasonable medical probability, no material deterioration of the patient condition or the condition of the unborn child(yarelis) is likely to result from the transfer   Reason for Transfer  Level of Care needed not available at this facility, No bed available at level of patient's needs   Benefits of Transfer  Specialized equipment and/or services available at the receiving facility (Include comment)________________________   Risks of Transfer  Potential for delay in receiving treatment, Possible worsening of condition or death during transfer, Potential deterioration of medical condition, Loss of IV, Increased discomfort during transfer   Accepting Physician  Dr Sonja Adams [facility], [facility]   Sending MD Dr Dilip Reyna   Provider Certification  General risk, such as traffic hazards, adverse weather conditions, rough terrain or turbulence, possible failure of equipment (including vehicle or aircraft), or consequences of actions of persons outside the control of the transport personnel      RN Documentation      Most [address] Name, [address]      Follow-up Information    None         Patient's Medications    No medications on file     No discharge procedures on file      PDMP Review     None          ED Provider  Electronically Signed by           Rowena Leyden, DO  06/29/21 1123

## 2021-06-29 NOTE — EMTALA/ACUTE CARE TRANSFER
[facility] EMERGENCY DEPARTMENT  [address] [place] [id number]  [phone]  Dept: [address] [facility] CONSENT    NAME Ash Crisostomo                                         1970                              MRN [id number]    I have been informed of my rights regarding examination, treatment, and transfer   by Dr Luz Smiht, DO    Benefits:      Risks:        Consent for Transfer:  I acknowledge that my medical condition has been evaluated and explained to me by the emergency department physician or other qualified medical person and/or my attending physician, who has recommended that I be transferred to the service of    at    The above potential benefits of such transfer, the potential risks associated with such transfer, and the probable risks of not being transferred have been explained to me, and I fully understand them  The doctor has explained that, in my case, the benefits of transfer outweigh the risks  I agree to be transferred  I authorize the performance of emergency medical procedures and treatments upon me in both transit and upon arrival at the receiving facility  Additionally, I authorize the release of any and all medical records to the receiving facility and request they be transported with me, if possible  I understand that the safest mode of transportation during a medical emergency is an ambulance and that the Hospital advocates the use of this mode of transport  Risks of traveling to the receiving facility by car, including absence of medical control, life sustaining equipment, such as oxygen, and medical personnel has been explained to me and I fully understand them  (KISHAN CORRECT BOX BELOW)  [  ]  I consent to the stated transfer and to be transported by ambulance/helicopter  [  ]  I consent to the stated transfer, but refuse transportation by ambulance and accept full responsibility for my transportation by car    I understand the risks of non-ambulance transfers and I exonerate the Hospital and its staff from any deterioration in my condition that results from this refusal     X___________________________________________    DATE  21  TIME________  Signature of patient or legally responsible individual signing on patient behalf           RELATIONSHIP TO PATIENT_________________________          Provider Certification    NAME Berry Li                                         1970                              MRN [id number]    A medical screening exam was performed on the above named patient  Based on the examination:    Condition Necessitating Transfer The encounter diagnosis was Cardiac arrest ([address] )  Patient Condition:      Reason for Transfer:      Transfer Requirements: Facility     · Space available and qualified personnel available for treatment as acknowledged by    · Agreed to accept transfer and to provide appropriate medical treatment as acknowledged by          · Appropriate medical records of the examination and treatment of the patient are provided at the time of transfer   [address], [address] _______  · Transfer will be performed by qualified personnel from    and appropriate transfer equipment as required, including the use of necessary and appropriate life support measures      Provider Certification: I have examined the patient and explained the following risks and benefits of being transferred/refusing transfer to the patient/family:         Based on these reasonable risks and benefits to the patient and/or the unborn child(yarelis), and based upon the information available at the time of the patients examination, I certify that the medical benefits reasonably to be expected from the provision of appropriate medical treatments at another medical facility outweigh the increasing risks, if any, to the individuals medical condition, and in the case of labor to the unborn child, from effecting the transfer      X____________________________________________ DATE 06/29/21        TIME_______      ORIGINAL - SEND TO MEDICAL RECORDS   COPY - SEND WITH PATIENT DURING TRANSFER

## 2021-06-29 NOTE — ED NOTES
Dirty epi drip mixed and administered via Erven Husbands, RN under Dr Mackenzie Poe verbal order     James Kirkland, RADHA  06/29/21 9954

## 2021-06-29 NOTE — ED NOTES
Epi Drip discontinued via Zo Rubio RN per Dr Donal Grier verbal order       Zina Vasquez RN  06/29/21 0592

## 2021-06-29 NOTE — ASSESSMENT & PLAN NOTE
· Likely secondary to anoxic brain injury  · Monitor closely  · Poor prognosis  · May need antihypertensive

## 2021-06-29 NOTE — RESPIRATORY THERAPY NOTE
RT Ventilator Management Note  Rhiannon Cervantes 49 y o  male MRN: [id number]  Unit/Bed#: ED 01 Encounter: [id number]      Daily Screen    No data found in the last 10 encounters  Physical Exam:   Assessment Type: (P) Assess only  General Appearance: (P) Unresponsive  Respiratory Pattern: (P) Assisted  Chest Assessment: (P) Chest expansion symmetrical    1345- Received pt from EMS with domingo airway in  Pt intubated by AP with a 7 5 oett located at 24cm at lip  B/S equal and bilat, positive color change on easy cap, cxr done  Pt was intinally placed on A/C 450-% and 8 of peep  After ct scan pt breathing over and taking vt of 2 lpm  Pt was placed on cpap with settings per flow sheet  ABG pending

## 2021-06-29 NOTE — ED NOTES
Pt arrived - cardiac arrest, compressions taking place   EMS gave 3 epinephrine pta         Zee Willett RN  06/29/21 8600

## 2021-06-29 NOTE — H&P
[facility]  H&P- Mariano Hollis 1970, 49 y o  male MRN: [id number]  Unit/Bed#: ICU 12 Encounter: [id number]  Primary Care Provider: No primary care provider on file  Date and time admitted to hospital: 6/29/2021  5:41 PM    * Cardiac arrest [facility])  Assessment & Plan  · Admitted to the ICU, this patient will require greater than a 2 midnight stay  · Unknown down time for  · Patient was doing scuba diving exercises and had prolonged submersion  · Patient asystolic and apneic pulled from water with CPR initiated by   · Received 3 rounds of epinephrine and bicarb with return of spontaneous circulation at [facility] emergency department    · Patient will not be cooled secondary to prolonged down time, poor prognostic indicators-coma/severe myoclonus/and multi system organ ischemic injuries    Anoxic brain injury [facility])  Assessment & Plan  · Monitor neuro status closely  · Poor prognosis  · Anticipate need for Gift of Life      Encephalopathy acute  Assessment & Plan  · Monitors neuro status closely  · Poor prognosis  · Anticipate need for give    Metabolic acidosis  Assessment & Plan  · Monitor metabolic status closely  · Repeat ABG now  · May need bicarb drip the ABG findings    Lactic acidosis  Assessment & Plan  · Repeat lactate level    ATN (acute tubular necrosis) (HCC)  Assessment & Plan  · Monitor renal status closely  · Avoid nephrotoxic drugs    Aspiration into respiratory tract  Assessment & Plan  · Monitor for signs of infection  · Hold on antibiotics for now right  · Monitor culture data    Transaminitis  Assessment & Plan  · Monitor LFTs  · This is likely secondary to cardiac arrest    Myoclonus  Assessment & Plan  · Poor prognosis    HTN (hypertension)  Assessment & Plan  · Likely secondary to anoxic brain injury  · Monitor closely  · Poor prognosis  · May need antihypertensive -------------------------------------------------------------------------------------------------------------  Chief Complaint:  None offered    History of Present Illness   HX and PE limited by:  Recent cardiac arrest  Silvestre Narvaez is a 49 y o  male who presents from [facility] status post cardiac arrest   Patient was scuba diving in local pool  Reportedly doing breathing exercise training at the bottom of the pool   noted that the patient was submerged for an extensive time and did not see the patient moving  Left guard pulled patient to surface and the patient was in full cardiac arrest asystolic and apneic  Patient was extricated from the pool and CPR was begun  EMS arrived and the patient received 3 rounds of epinephrine and airway was secured using a Esequiel airway and transferred to [facility] with return of spontaneous circulation  While in the emergency department the patient was hyper oxygenated and a Esequiel airway was removed in the ET tube was placed  The patient had brief period of bradycardia with ET tube exchange which subsequently returned to sinus rhythm at a rate of approximately 60 beats per minute  While in the emergency department the patient received 2 amps of bicarb, a saline bolus, atropine 1 mg IV, and a bicarbonate infusion  The patient was subsequently transferred to [facility] for critical care management  History obtained from chart review  -------------------------------------------------------------------------------------------------------------  Dispo:  Admit to Critical Care     Code Status: Level 1 - Full Code  --------------------------------------------------------------------------------------------------------------  Review of Systems   Unable to perform ROS: Intubated       Review of systems was unable to be performed secondary to The patient being intubated and encephalopathic    Physical Exam  Vitals and nursing note reviewed  Constitutional:       Comments: Unresponsive, intubated, post cardiac arrest   HENT:      Head: Normocephalic and atraumatic  Nose: Nose normal       Mouth/Throat:      Mouth: Mucous membranes are moist       Pharynx: No oropharyngeal exudate  Comments: Positive cough, positive gag  Eyes:      Pupils: Pupils are equal, round, and reactive to light  Comments: Patient exhibits fluttering the eyelids, periodic upward gaze  No corneal reflex  Pupils are 3 and nonreactive  Neck:      Vascular: No carotid bruit  Cardiovascular:      Rate and Rhythm: Regular rhythm  Bradycardia present  Pulses: Normal pulses  Heart sounds: Normal heart sounds  Pulmonary:      Breath sounds: No wheezing, rhonchi or rales  Comments: Intubated, coarse breath sounds throughout all lung fields  Tan secretions from ET tube  Abdominal:      General: Abdomen is flat  Bowel sounds are normal       Palpations: Abdomen is soft  Comments: Watery diarrhea   Genitourinary:     Comments: Retana patent  Musculoskeletal:         General: Normal range of motion  Lymphadenopathy:      Cervical: No cervical adenopathy  Skin:     General: Skin is warm  Capillary Refill: Capillary refill takes 2 to 3 seconds  Comments: Chest wall abrasion/contusion across sternum   Neurological:      Deep Tendon Reflexes: Reflexes abnormal       Comments: GCS is 3 T, patient is intubated, positive mild clonus, no corneal reflex, positive gag  Psychiatric:      Comments: Intubated, GCS 3 T       --------------------------------------------------------------------------------------------------------------  Vitals: There were no vitals filed for this visit  Temp  Min: 93 5 °F (34 2 °C)  Max: 94 9 °F (34 9 °C)        There is no height or weight on file to calculate BMI      Laboratory and Diagnostics:  Results from last 7 days   Lab Units 06/29/21  1417   WBC Thousand/uL 4 10*   HEMOGLOBIN g/dL 12 3*   HEMATOCRIT % 41 0   PLATELETS Thousands/uL 185   MONO PCT % 4     Results from last 7 days   Lab Units 21  1549   SODIUM mmol/L 133*   POTASSIUM mmol/L 4 8   CHLORIDE mmol/L 101   CO2 mmol/L 15*   ANION GAP mmol/L 17*   BUN mg/dL 14   CREATININE mg/dL 1 48   CALCIUM mg/dL 8 3*   GLUCOSE RANDOM mg/dL 244*   ALT U/L 172*   AST U/L 390*   ALK PHOS U/L 106   ALBUMIN g/dL 3 4   TOTAL BILIRUBIN mg/dL 1 44*          Results from last 7 days   Lab Units 21  1417   INR  2 14*   PTT seconds 51*      Results from last 7 days   Lab Units 21  1548   TROPONIN I ng/mL 0 23*     Results from last 7 days   Lab Units 21  1659 21  1417   LACTIC ACID mmol/L 6 6* 23 0*     [id number] labs pending:  Ionized calcium, phosphorus, INR, PTT, troponin, BNP, CK, arterial blood gas, CBC with differential, CMP, lactic acid, and magnesium  ABG:  Results from last 7 days   Lab Units 21  1623   PH ART  7 150*   PCO2 ART mm Hg 45 0   PO2 ART mm Hg 117 0*   HCO3 ART mmol/L 15 7*   BASE EXC ART mmol/L -12 9*   ABG SOURCE  Radial, Left     VBG:  Results from last 7 days   Lab Units 21  1623   ABG SOURCE  Radial, Left         Urine drug screen positive for THC  Micro:  2021 COVID negative  2021 blood cultures x2 pending  2021 procalcitonin pending    EK2021 EKG is sinus tachycardia  Imagin2021 chest x-ray reveals no acute cardiopulmonary disease  2021 CT of head without IV contrast reveals no acute intracranial abnormalities  2021 CT cervical spine without contrast reveals no cervical spine fracture or traumatic malalignment    Historical Information   No past medical history on file  No past surgical history on file    Social History   Social History     Substance and Sexual Activity   Alcohol Use Not on file     Social History     Substance and Sexual Activity   Drug Use Not on file     Social History     Tobacco Use   Smoking Status Not on file Family History:   No family history on file  Family history unknown and I have reviewed this patient's family history and commented on sigificant items within the HPI      Medications:  Current Facility-Administered Medications   Medication Dose Route Frequency    chlorhexidine (PERIDEX) 0 12 % oral rinse 15 mL  15 mL Mouth/Throat Q12H [address]    fentanyl citrate (PF) (FOR EMS ONLY) 100 mcg/2 mL injection 100 mcg  1 each Does not apply Once    heparin (porcine) subcutaneous injection 5,000 Units  5,000 Units Subcutaneous Q8H [address]    propofol (DIPRIVAN) 1000 mg in 100 mL infusion (premix)  5-50 mcg/kg/min Intravenous Titrated    sodium bicarbonate 150 mEq in dextrose 5 % 1,000 mL infusion  100 mL/hr Intravenous Continuous     Home medications:  None     Allergies:  No Known Allergies      Anticipated Length of Stay is > 2 midnights    Care Time Delivered:   Upon my evaluation, this patient had a high probability of imminent or life-threatening deterioration due to Recent cardiac arrest with multi system in organ dysfunction, which required my direct attention, intervention, and personal management  I have personally provided Sixty minutes (Fifty eight to Sixty-two) of critical care time, exclusive of procedures, teaching, family meetings, and any prior time recorded by providers other than myself  CALLIE Barrera        Portions of the record may have been created with voice recognition software  Occasional wrong word or "sound a like" substitutions may have occurred due to the inherent limitations of voice recognition software    Read the chart carefully and recognize, using context, where substitutions have occurred

## 2021-06-29 NOTE — ASSESSMENT & PLAN NOTE
· Admitted to the ICU, this patient will require greater than a 2 midnight stay  · Unknown down time for  · Patient was doing scuba diving exercises and had prolonged submersion  · Patient asystolic and apneic pulled from water with CPR initiated by   · Received 3 rounds of epinephrine and bicarb with return of spontaneous circulation at [facility] emergency department    · Patient will not be cooled secondary to prolonged down time, poor prognostic indicators-coma/severe myoclonus/and multi system organ ischemic injuries

## 2021-06-30 PROBLEM — E87.2 METABOLIC ACIDOSIS: Status: RESOLVED | Noted: 2021-01-01 | Resolved: 2021-01-01

## 2021-06-30 NOTE — RESPIRATORY THERAPY NOTE
Pt transported to CAT Scan and back to ICU on transport vent without any incident  ETT remains intact  Sat 98%

## 2021-06-30 NOTE — PROCEDURES
Central Line Insertion    Date/Time: 6/29/2021 8:07 PM  Performed by: CALLIE Pardo  Authorized by: CALLIE Pardo     Consent:     Consent obtained:  Emergent situation    Risks discussed:  Bleeding, infection and incorrect placement  Universal protocol:     Immediately prior to procedure, a time out was called: yes      Patient identity confirmed:  Arm band  Pre-procedure details:     Hand hygiene: Hand hygiene performed prior to insertion      Skin preparation:  2% chlorhexidine    Skin preparation agent: Skin preparation agent completely dried prior to procedure    Indications:     Central line indications: medications requiring central line    Anesthesia (see MAR for exact dosages): Anesthesia method:  Local infiltration    Local anesthetic:  Lidocaine 1% w/o epi  Procedure details:     Location:  Right femoral    Vessel type: vein      Laterality:  Right    Approach: percutaneous technique used      Patient position:  Flat    Catheter type:  Triple lumen 20cm    Landmarks identified: yes      Ultrasound guidance: yes      Ultrasound image availability:  Still images obtained and images available in PACS    Sterile ultrasound techniques: Sterile gel and sterile probe covers were used      Manometry confirmation: no      Number of attempts:  1    Successful placement: yes    Post-procedure details:     Post-procedure:  Dressing applied and line sutured    Assessment:  Blood return through all ports and free fluid flow    Patient tolerance of procedure:   Tolerated well, no immediate complications

## 2021-06-30 NOTE — ASSESSMENT & PLAN NOTE
· Unknown down time for  · Patient was doing diving breathing exercises and had prolonged submersion  · Patient asystolic and apneic pulled from water with CPR initiated by   · Received 3 rounds of epinephrine and bicarb with return of spontaneous circulation at [facility] emergency department    · Patient was mot cooled secondary to prolonged down time, poor prognostic indicators-coma/severe myoclonus/and multi system organ ischemic injuries  · ECHO EF 65%, no RWMA, RV mildly dilated, Peak PA pressure 42mm HG

## 2021-06-30 NOTE — ASSESSMENT & PLAN NOTE
· In light of increased WBC, and PCT  · Initiated GN coverage and anaerobic coverage with Cefepime and Flagyl D#2 (started 6/30)

## 2021-06-30 NOTE — PROGRESS NOTES
I spoke to patient's sisters Tyler Pocatello [phone] (lives in [place]) and Leonardo Poole [address] [facility] [phone] (lives in [address] [place] [address] provided them an update on the patient's status  Contact information for the ICU was provided  Leonardo Poole will be driving up to [place] later today  Pt remains a full code  Pt's father is alive and currently living with [address]  Pt is  according to [address] and has, "young children"        CALLIE Wade

## 2021-06-30 NOTE — UTILIZATION REVIEW
Initial Clinical Review    TRANSFER FROM [place] ED    Admission: Date/Time/Statement:   Admission Orders (From admission, onward)     Ordered        06/29/21 1803  Inpatient Admission  Once                   Orders Placed This Encounter   Procedures    Inpatient Admission     Standing Status:   Standing     Number of Occurrences:   1     Order Specific Question:   Level of Care     Answer:   Critical Care [15]     Order Specific Question:   Estimated length of stay     Answer:   More than 2 Midnights     Order Specific Question:   Certification     Answer:   I certify that inpatient services are medically necessary for this patient for a duration of greater than two midnights  See H&P and MD Progress Notes for additional information about the patient's course of treatment  Initial Presentation:   49 Y O  Male  Initially presented to ED at  [facility] after a cardiac arrest  Was  Scuba diving in a  Pool,   noticed patient  Not moving, found in  Full cardiac arrest, asystolic and apneic  EMS arrived, received 3 rounds of epi,  Airway secured and  Return of spontaneous circulation on ED arrival at  [address] [place] [address]  Received  2 amps of bicarb, saline bolus, IV  Atropine and bicarbonate infusion in ED and transferred to [facility] for further care  Admit  Ip    ICU  LOC  With  Cardiac arrest, likely  Anoxic brain injury,  Unknown down time,   acute encephalopathy, metabolic acidosis, lactic acidosis, aspiration into respiratory tract  ATN and  transaminitis and plan is  Monitor neuro status closely,   Will  Not  Be cooled, poor prognosis, hold antibiotics for now and may need bicarb drip  Date:   6/30        Day 2:   Remains intubated/sedated  Myoclonus   Vs  Seizure activity, favor myoclonus, loaded  With keppra  Check  EEG  Monitor cultures, hold antibiotics for now  Anticipate  Need for Gift for  Life  Poor prognosis         Additional Vital Signs:       Date/Time  Temp Pulse  Resp  BP  MAP (mmHg)  Arterial Line BP  MAP  SpO2  O2 Device   06/30/21 1122  --  --  --  193/79Abnormal   --  --  --  --  --   06/30/21 1028  100 °F (37 8 °C)  62  18  194/97Abnormal   144  262/78  116 mmHg  100 %  --   06/30/21 0940  100 °F (37 8 °C)  62  18  185/85Abnormal   124  246/78  114 mmHg  100 %  --   06/30/21 0901  100 °F (37 8 °C)  60  17  175/82Abnormal   122  228/74  108 mmHg  100 %  --   06/30/21 0830  100 °F (37 8 °C)  58  19  167/79  122  224/74  106 mmHg  100 %  --   06/30/21 0817  99 7 °F (37 6 °C)  60  20  162/79  113  212/70  100 mmHg  100 %  --   06/30/21 0807  100 °F (37 8 °C)  62  20  134/68  86  194/60  84 mmHg  100 %  --   06/30/21 0805  100 °F (37 8 °C)  64  19  --  --  194/62  86 mmHg  100 %  --   06/30/21 0754  --  --  --  195/66Abnormal   --  --  --  --  --   06/30/21 0745  100 °F (37 8 °C)  60  18  --  --  194/62  88 mmHg  100 %  --   06/30/21 0730  100 °F (37 8 °C)  62  18  --  --  190/62  88 mmHg  100 %  --   06/30/21 0726  100 °F (37 8 °C)  62  18  --  --  188/62  88 mmHg  100 %  --   06/30/21 0712  100 °F (37 8 °C)  66  19  --  --  186/64  92 mmHg  100 %  --   06/30/21 0638  99 7 °F (37 6 °C)  62  18  --  --  164/54  76 mmHg  100 %  --   06/30/21 0600  99 7 °F (37 6 °C)  64  24Abnormal   --  --  152/54  74 mmHg  100 %  --   06/30/21 0500  99 7 °F (37 6 °C)  68  24Abnormal   --  --  142/54  74 mmHg  100 %  --   06/30/21 0400  99 7 °F (37 6 °C)  66  24Abnormal   --  --  144/56  78 mmHg  100 %  --   06/30/21 0300  99 3 °F (37 4 °C)  64  24Abnormal   --  --  150/60  84 mmHg  100 %  --   06/30/21 0200  99 3 °F (37 4 °C)  64  26Abnormal   --  --  140/60  82 mmHg  100 %  --   06/30/21 0100  99 °F (37 2 °C)  62  25Abnormal   --  --  88/72  80 mmHg  100 %  --   06/30/21 0000  99 °F (37 2 °C)  60  26Abnormal   --  --  138/62  82 mmHg  100 %  --   06/29/21 2330  --  --  --  --  --  --  --  100 %  --   06/29/21 2300  98 6 °F (37 °C)  62  24Abnormal   --  --  90/70  78 mmHg  100 %  -- 06/29/21 2200  98 6 °F (37 °C)  58  27Abnormal   --  --  90/82  86 mmHg  100 %  --   06/29/21 2100  98 2 °F (36 8 °C)  66  28Abnormal   --  --  110/64  80 mmHg  100 %  --   06/29/21 2000  97 9 °F (36 6 °C)  66  33Abnormal   --  --  168/90  112 mmHg  100 %  BiPAP   06/29/21 1957  97 9 °F (36 6 °C)  66  34Abnormal   --  --  172/92  118 mmHg  98 %  --   06/29/21 1945  97 9 °F (36 6 °C)  52Abnormal   35Abnormal   --  --  166/88  114 mmHg  100 %  --   06/29/21 1935  97 5 °F (36 4 °C)  60  34Abnormal   --  --  140/82  104 mmHg  100 %  --   06/29/21 1925  97 5 °F (36 4 °C)  56  35Abnormal   --  --  168/100  124 mmHg  97 %  --   06/29/21 1913  --  --  --  --  --  --  --  100 %  --   06/29/21 1910  97 9 °F (36 6 °C)  60  32Abnormal   103/74  89  --  --  100 %  --   06/29/21 1908  97 9 °F (36 6 °C)  60  30Abnormal   105/73  88  --  --  100 %  --   06/29/21 1900  97 9 °F (36 6 °C)  68  28Abnormal   94/67  76  --  --  100 %  --   06/29/21 1858  97 9 °F (36 6 °C)  72  24Abnormal   81/64Abnormal   69  --  --  100 %  --   06/29/21 1850  97 5 °F (36 4 °C)  80  29Abnormal   63/47Abnormal   52  --  --  100 %  --   06/29/21 1844  97 5 °F (36 4 °C)  78  30Abnormal   64/50Abnormal   55  --  --  99 %  --   06/29/21 1830  97 5 °F (36 4 °C)  78  28Abnormal   92/52  67  --  --  100 %  --   06/29/21 1810  97 9 °F (36 6 °C)  68  33Abnormal   96/49Abnormal   69  --  --  100 %  --   06/29/21 1750  --  72  27Abnormal   108/68  83  --  --  94 %  --   06/29/21 1748  --  72  28Abnormal   114/71  90  --  --  95 %  --   06/29/21 1743  --  --  --  113/68  82  --  --  --           Pertinent Labs/Diagnostic Test Results:   CXR  ( 6/29)        Lines and tubes in expected position  2   No pneumothorax      NAD  Ct  Head  ( 6/29)  No acute intracranial abnormality  Ct  C/spine  ( 6/29)     No fracture  EKG    ( 6/29)    SB     HR  59    Non specific   T waves  Results from last 7 days   Lab Units 06/29/21  1534   SARS-COV-2  Negative     Results from last 7 days   Lab Units 06/30/21  0508 06/29/21  1837 06/29/21  1417   WBC Thousand/uL 13 05* 6 57 4 10*   HEMOGLOBIN g/dL 13 1 13 6 12 3*   HEMATOCRIT % 37 7 39 3 41 0   PLATELETS Thousands/uL 186 221 185   TOTAL NEUT ABS Thousand/uL  --   --  0 29*   BANDS PCT %  --  11*  --          Results from last 7 days   Lab Units 06/30/21  0508 06/29/21  1837 06/29/21  1549   SODIUM mmol/L 135* 138 133*   POTASSIUM mmol/L 4 5 3 5 4 8   CHLORIDE mmol/L 101 103 101   CO2 mmol/L 24 26 15*   ANION GAP mmol/L 10 9 17*   BUN mg/dL 21 17 14   CREATININE mg/dL 1 97* 1 66* 1 48   EGFR ml/min/1 73sq m 38 47 54*   CALCIUM mg/dL 7 7* 7 8* 8 3*   CALCIUM, IONIZED mmol/L 1 04* 1 12  --    MAGNESIUM mg/dL 2 7* 3 4*  --    PHOSPHORUS mg/dL 5 1* 5 0*  --      Results from last 7 days   Lab Units 06/30/21  0508 06/29/21  1837 06/29/21  1549   AST U/L 483* 400* 390*   ALT U/L 222* 240* 172*   ALK PHOS U/L 87 150* 106   TOTAL PROTEIN g/dL 5 5* 5 9* 6 1   ALBUMIN g/dL 2 6* 2 7* 3 4   TOTAL BILIRUBIN mg/dL 0 79 0 81 1 44*         Results from last 7 days   Lab Units 06/30/21  0508 06/29/21  1837 06/29/21  1549   GLUCOSE RANDOM mg/dL 162* 103 244*      Results from last 7 days   Lab Units 06/30/21  0527 06/29/21  1902 06/29/21  1623   PH ART  7 454* 7 343* 7 150*   PCO2 ART mm Hg 28 0* 38 1 45 0   PO2 ART mm Hg 151 2* 92 6 117 0*   HCO3 ART mmol/L 19 2* 20 2* 15 7*   BASE EXC ART mmol/L -3 4 -4 9 -12 9*   O2 CONTENT ART mL/dL 18 8 19 3 18 9   O2 HGB, ARTERIAL % 97 9* 95 5 95 2   ABG SOURCE  Line, Arterial Radial, Right Radial, Left             Results from last 7 days   Lab Units 06/29/21  1837   CK TOTAL U/L 1,111*   CK MB INDEX % 1 6   CK MB ng/mL 18 2*     Results from last 7 days   Lab Units 06/30/21  0508 06/29/21  2122 06/29/21  1837 06/29/21  1548   TROPONIN I ng/mL 2 76* 2 80* 1 61* 0 23*         Results from last 7 days   Lab Units 06/29/21  1837 06/29/21  1417   PROTIME seconds 17 6* 24 0*   INR  1 48* 2 14*   PTT seconds 32 51* Results from last 7 days   Lab Units 06/30/21  0508 06/29/21  1837   PROCALCITONIN ng/ml 38 44* 3 77*     Results from last 7 days   Lab Units 06/29/21  2122 06/29/21  1837 06/29/21  1659 06/29/21  1417   LACTIC ACID mmol/L 2 9* 4 4* 6 6* 23 0*             Results from last 7 days   Lab Units 06/29/21  1837   NT-PRO BNP pg/mL 22                         Results from last 7 days   Lab Units 06/29/21  1535   CLARITY UA  Bloody*   COLOR UA  Red*   SPEC GRAV UA  1 015   PH UA  7 0   GLUCOSE UA mg/dl 100 (1/10%)*   KETONES UA mg/dl 5 (Trace)*   BLOOD UA  250 0*   PROTEIN UA mg/dl >=500*   NITRITE UA  Negative   BILIRUBIN UA  Negative   UROBILINOGEN UA mg/dL Negative   LEUKOCYTES UA  Negative   WBC UA /hpf 2-4   RBC UA /hpf 10-20*   BACTERIA UA /hpf Innumerable*   EPITHELIAL CELLS WET PREP /hpf Occasional   MUCUS THREADS  Occasional*             Results from last 7 days   Lab Units 06/29/21  1535   AMPH/METH  Negative   BARBITURATE UR  Negative   BENZODIAZEPINE UR  Negative   COCAINE UR  Negative   METHADONE URINE  Negative   OPIATE UR  Negative   PCP UR  Negative   THC UR  Positive*     Results from last 7 days   Lab Units 06/29/21  1548   ETHANOL LVL mg/dL <10                 Results from last 7 days   Lab Units 06/29/21  1838   BLOOD CULTURE  Received in Microbiology Lab  Culture in Progress  Received in Microbiology Lab  Culture in Progress  Results from last 7 days   Lab Units 06/29/21  1837 06/29/21  1417   TOTAL COUNTED  100 100           Admitting Diagnosis: Cardiac arrest ([facility] [address] ) [I46 9]  [place] [T75  1XXA]  Age/Sex: 49 y o  male  Admission Orders:  Scheduled Medications:  chlorhexidine, 15 mL, Mouth/Throat, Q12H [address]  heparin (porcine), 5,000 Units, Subcutaneous, Q8H [address]  levETIRAcetam, 750 mg, Intravenous, Q12H [address]      Continuous IV Infusions:     PRN Meds:  acetaminophen, 650 mg, Oral, Q4H PRN  hydrALAZINE, 10 mg, Intravenous, Q6H PRN        IP CONSULT TO CASE MANAGEMENT     Neuro checks  Q 1 hour  Fall precautions  Dysphagia eval    Network Utilization Review Department  ATTENTION: Please call with any questions or concerns to [phone] and carefully listen to the prompts so that you are directed to the right person  All voicemails are confidential   Donavon Page all requests for admission clinical reviews, approved or denied determinations and any other requests to dedicated fax number below belonging to the campus where the patient is receiving treatment   List of dedicated fax numbers for the Facilities:  [address] DENIALS (Administrative/Medical Necessity) [phone]   [address] (Maternity/NICU/Pediatrics) [phone]   [address] [place] [address] [address] [place] [address] [facility] [address] [address] [address] [address] [address] [place] [address] [phone]

## 2021-06-30 NOTE — PROCEDURES
Arterial Line Insertion    Date/Time: 6/29/2021 8:09 PM  Performed by: CALLIE Grider  Authorized by: CALLIE Grider     Consent:     Consent obtained:  Emergent situation    Risks discussed:  Bleeding and ischemia  Universal protocol:     Immediately prior to procedure a time out was called: yes      Patient identity confirmed:  Arm band  Indications:     Indications: hemodynamic monitoring and multiple ABGs    Pre-procedure details:     Skin preparation:  Chlorhexidine    Preparation: Patient was prepped and draped in sterile fashion    Anesthesia (see MAR for exact dosages): Anesthesia method:  None  Procedure details:     Location / Tip of Catheter:  Radial    Laterality:  Right    Placement technique:  Percutaneous    Number of attempts:  1    Successful placement: yes      Transducer: waveform confirmed    Post-procedure details:     Post-procedure:  Biopatch applied and sutured    Patient tolerance of procedure:   Tolerated well, no immediate complications

## 2021-06-30 NOTE — ASSESSMENT & PLAN NOTE
· Monitors neuro status closely  · Suspect related to anoxic/ischemic brain injury  · Poor prognosis
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
## 2021-06-30 NOTE — PLAN OF CARE
Problem: MOBILITY - ADULT  Goal: Maintain or return to baseline ADL function  Description: INTERVENTIONS:  -  Assess patient's ability to carry out ADLs; assess patient's baseline for ADL function and identify physical deficits which impact ability to perform ADLs (bathing, care of mouth/teeth, toileting, grooming, dressing, etc )  - Assess/evaluate cause of self-care deficits   - Assess range of motion  - Assess patient's mobility; develop plan if impaired  - Assess patient's need for assistive devices and provide as appropriate  - Encourage maximum independence but intervene and supervise when necessary  - Involve family in performance of ADLs  - Assess for home care needs following discharge   - Consider OT consult to assist with ADL evaluation and planning for discharge  - Provide patient education as appropriate  Outcome: Not Progressing  Goal: Maintains/Returns to pre admission functional level  Description: INTERVENTIONS:  - Perform BMAT or MOVE assessment daily    - Set and communicate daily mobility goal to care team and patient/family/caregiver  - Collaborate with rehabilitation services on mobility goals if consulted  - Perform Range of Motion 3 times a day  - Reposition patient every 2 hours    - Dangle patient times a day  - Stand patient times a day  - Ambulate patient times a day  - Out of bed to chair times a day   - Out of bed for meals times a day  - Out of bed for toileting  - Record patient progress and toleration of activity level   Outcome: Not Progressing     Problem: Potential for Falls  Goal: Patient will remain free of falls  Description: INTERVENTIONS:  - Educate patient/family on patient safety including physical limitations  - Instruct patient to call for assistance with activity   - Consult OT/PT to assist with strengthening/mobility   - Keep Call bell within reach  - Keep bed low and locked with side rails adjusted as appropriate  - Keep care items and personal belongings within reach  - Initiate and maintain comfort rounds  - Make Fall Risk Sign visible to staff  - Offer Toileting every Hours, in advance of need  - Initiate/Maintain alarm  - Obtain necessary fall risk management equipment:   - Apply yellow socks and bracelet for high fall risk patients  - Consider moving patient to room near nurses station  Outcome: Progressing     Problem: Prexisting or High Potential for Compromised Skin Integrity  Goal: Skin integrity is maintained or improved  Description: INTERVENTIONS:  - Identify patients at risk for skin breakdown  - Assess and monitor skin integrity  - Assess and monitor nutrition and hydration status  - Monitor labs   - Assess for incontinence   - Turn and reposition patient  - Assist with mobility/ambulation  - Relieve pressure over bony prominences  - Avoid friction and shearing  - Provide appropriate hygiene as needed including keeping skin clean and dry  - Evaluate need for skin moisturizer/barrier cream  - Collaborate with interdisciplinary team   - Patient/family teaching  - Consider wound care consult   Outcome: Progressing

## 2021-06-30 NOTE — CASE MANAGEMENT
CM completed a search for pt's next of kin  CM called pt's father, Leila Morales ([phone]), Merle Hassan answered  Merle, confirmed pt's birth year and stated she is pt's sister  Kathy Narayanan stated her father is older and does not want to tell him until she has all of the information regarding her brother  CM explained that the pt is currently in the hospital and is not responsive  Provided emotional support  CM explained that  the ICU Lead RN will call to provide more detailed information on pt's condition  Merle thanked this CM for the information and awaits a phone call from pt's RN
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
## 2021-06-30 NOTE — UTILIZATION REVIEW
Inpatient Admission Authorization Request   NOTIFICATION OF INPATIENT ADMISSION/INPATIENT AUTHORIZATION REQUEST   SERVICING FACILITY:   [address], [facility], [address] [facility]  Tax ID: [id number]  NPI: [id number]  Place of Service: Inpatient [facility]  [address]  Place of Service Code: 24     ATTENDING PROVIDER:  Attending Name and NPI#: Jered [place], [place] [[id number]]  Address: [address] [place] [address], [facility], [facility] [address] [facility]  Phone: [phone]     UTILIZATION REVIEW CONTACT:  Natividad Gilman, Utilization   Network Utilization Review Department  Phone: [phone]  Fax: [phone]  Email: [email or web address]     PHYSICIAN ADVISORY SERVICES:  FOR [id number] REVIEW - MEDICAL NECESSITY DENIAL  Phone: [phone]  Fax: [phone]  Email: [email or web address]     TYPE OF REQUEST:  Inpatient Status     ADMISSION INFORMATION:  ADMISSION DATE/TIME: 6/29/21  5:41 PM  PATIENT DIAGNOSIS CODE/DESCRIPTION:  Cardiac arrest ([facility] [address] ) [I46 9]  [place] [T75  1XXA]  DISCHARGE DATE/TIME: No discharge date for patient encounter  DISCHARGE DISPOSITION (IF DISCHARGED): [facility]     IMPORTANT INFORMATION:  Please contact the Natividad Gilman directly with any questions or concerns regarding this request  Department voicemails are confidential     Send requests for admission clinical reviews, concurrent reviews, approvals, and administrative denials due to lack of clinical to fax [phone]

## 2021-06-30 NOTE — ASSESSMENT & PLAN NOTE
· Myoclonus versus seizure activity but favor myoclonus  · Loaded with keppra  · Would check spot EEG

## 2021-06-30 NOTE — CASE MANAGEMENT
CM searching for pt's family per ICU's request   CM understands pt has a poor prognosis  CM called pt's insurance company, spoke with Nevaeh Ramirez and inquired if pat has an emergency contact on file  She reports patients phone number listed is [phone]  Patient does not have emergency contact listed with insurance  Pt's PCP listed with insurance is Deepa Deutsch [phone]    CM called pt's home number, spoke with Corinne who is the  of his apartment complex [address] [facility]  Corinne was already aware that pt is in the hospital and she has been looking for emergency contact for patient  She has pt's cell phone, but reports she is unable to unlock it and unable to access contacts  Pt's home address is [address] [place] [address], [address] [place] [address], [place] [address]  Pt lives alone in this apartment  Kathy reports she does not have an emergency contact on file for patient  He did live there with an x girlfriend at one point in time, but her phone number is disconnected  She is aware that pt has a wife in [place] and patient has a brother but does not have phone number for either  Chaka Paget is looking into trying to find contacts for patient and will get back to CM as soon as possible; her phone [phone]  CM called pt's PCP, [facility]  Spoke with Isabella, emergency contact is listed as Christiano Tipton, son with same phone number as patient [phone]        CM continuing to search

## 2021-06-30 NOTE — PROGRESS NOTES
06/30/21 1300   Clinical Encounter Type   Visited With Health care provider   Routine Visit Follow-up   Continue Visiting Yes

## 2021-07-01 PROBLEM — E23.2 DI (DIABETES INSIPIDUS) (HCC): Status: ACTIVE | Noted: 2021-01-01

## 2021-07-01 NOTE — CASE MANAGEMENT
It was reported to this CM that yesterday while CM was searching for family, Alton Barry spoke with X wife, David Starr for a brief period of time but [employer] was not willing to confirm pt's date of birth and declined to be involved with patient and no further information was provided to her at that time  Today, CM received call from pt's X wife, Niyah Kelley [phone]  She reports the was surprised by phone call yesterday and declined to be involved because she was not sure what was going on  She confirmed that she and patient are legally   The have 3 children together, a 21year old son, Martin Alberto, a 16year old daughter and a 15year old son  Because she is pt's X wife, and pt's sisters are now involved, CM did not provide her with additional information but did suggest that she should reach out to pt's sisters to get information  She did say that she has pt's sister's Chelsea's phone number but seemed reluctant to reach out to pt's family  She began to cry and said "for the sake of the children! Please! I need to know what is going on! I know we don't have a good relationship but his children need to know what is happening!"    CM offered to call Chelsea and inform her that [employer] is asking for an update, Hafsa agreed CM can provided her phone number to pt's family  CM called pt's sister Sharon Hernandez and provided above info and took [employer] number  She reports she is now in the [place] area, she stayed in the hospital family waiting room last night  CM understands from ICU that pt has a poor prognosis and [facility] is being contacted  If there are medical decisions that need to be made regarding pt's conditions, CM will explore contact information for pt's 21year old son, as he would be closer next of kin than pt's sisters  CM following

## 2021-07-01 NOTE — ASSESSMENT & PLAN NOTE
· Repeat lactate level  Recent Labs     06/29/21  1659 06/29/21  1837 06/29/21  2122   LACTICACID 6 6* 4 4* 2 9*   ·

## 2021-07-01 NOTE — PROGRESS NOTES
Will monitor care of plan, if plans to intiate TF recommend Evan Rome@[email or web address] advance as tolerated to goal of 75mL, water flushes 125mL q 6hrs provides total of 1908cal, 79g pro, 2012mL

## 2021-07-01 NOTE — ASSESSMENT & PLAN NOTE
Recent Labs     06/29/21  1659 06/29/21  1837 06/29/21  2122   LACTICACID 6 6* 4 4* 2 9*   Receiving IV fluids
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
## 2021-07-01 NOTE — QUICK NOTE
Upon chart review, found a contact number for pranay Oconnell [phone] [facility])  Left a message identifying myself and asking for a call back to [phone] for the ICU

## 2021-07-01 NOTE — ASSESSMENT & PLAN NOTE
· In light of increased WBC, and PCT  · Initiated GN coverage and anaerobic coverage with Cefepime and Flagyl D#3 (started 6/30)
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
## 2021-07-01 NOTE — ASSESSMENT & PLAN NOTE
· Increased urine output to around 17 cc/hr   Was started on DDAVP  · Hypernatremia of 157 on AM labs  · Matching fluids to urine output with isolyte    Plan:   · Scheduled DDAVP 2 mcg q6  · Currently on hold as urine output has decreased to acceptable rate

## 2021-07-01 NOTE — QUICK NOTE
Team called patient's son Fabian Ramirez, phone number [phone]  Left message on the machine with call back number
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
## 2021-07-01 NOTE — CASE MANAGEMENT
Spoke with pt's sister Lokesh Grullon who reports she spoke with pt's x wife, who provided the phone number for pt's son Vale Kidd, phone [phone]  CM spoke with him briefly  Family has explained to him patient's condition  He reports he was not in touch with patient recently but would like to be involved now  He is also in agreement with pt's sister Mainsha Mcbride being involved  He lives in [place] and does not know if it is possible for him to come and visit the hospital   CM explained CM will ask ICU team to call him with more a medical update than CM can provide

## 2021-07-01 NOTE — QUICK NOTE
I updated the patient's 2 sisters - one at the bedside and the other on speaker phone  Per sister patient has 3 children, ages 21, 16, 15  Oldest is patient's son, apparently estranged  She states that she does not have available number or location or patient's son  I attempted to call the patient's ex-wife [phone]  to obtain number of the patient's son and went to voicemail on multiple attempts  Left VM to return call to ICU  Sister states that both son and patient's father do not want to make decisions for the patient, but I have been unable to confirm this with father or son  I discussed that patient is without reflexes such as cough, gag, pupils, response to pain, but is over breathing the vent, so is not considered brain dead at this time  I did discuss grim prognosis and that patient may progress to brain death at any time  At this time, sister states that she would want to continue current level of care to see if any improvement in neuro status
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
## 2021-07-02 PROBLEM — I95.9 HYPOTENSION: Status: ACTIVE | Noted: 2021-01-01

## 2021-07-02 NOTE — CASE MANAGEMENT
Call from pt's son Agatha [phone]  He reports he thought about pt's situation last night and thought about needing to make decisions on pt's behalf  He said he thinks this is too much responsibility  He is deferring decision making to his aunt (pt's sister) Manisha Mcbride
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
## 2021-07-02 NOTE — CASE MANAGEMENT
Per [address] [employer] because son her declined to be involved with care, next health care decision maker falls to pt's father, Chuckie Hampton  CM called pt's sister Ulises Breath [phone]  Merle reports the family is having a paid home health aid come to the hospital to be with patient soon  She is flying here from [place]  Her name is Meagan Salvador  She reports the family is doing this because Manju Zamorano will need to leave soon  Spoke with pt's father Robert Kumar [phone]  He reports he is currently living in [place] with his daughter Kal Hurst for the past few months but will be returning to [facility] next week  He is deferring decision making to Manju Zamorano  Spoke with Merle again, explained that because her father has declined decision making, she and Manju Zamorano have equil decision making authority  Merle reports that their family is talking about all decisions together and Manju Zamorano is their spokes person  She is also deferring decision making to Manju Zamorano

## 2021-07-02 NOTE — ASSESSMENT & PLAN NOTE
· Currently on Levophed, phenylephrine, epinephrine  · Bedside ultrasound of IVC yesterday indicates volume depletion  Patient also received fluids

## 2021-07-02 NOTE — PLAN OF CARE
Problem: Prexisting or High Potential for Compromised Skin Integrity  Goal: Skin integrity is maintained or improved  Description: INTERVENTIONS:  - Identify patients at risk for skin breakdown  - Assess and monitor skin integrity  - Assess and monitor nutrition and hydration status  - Monitor labs   - Assess for incontinence   - Turn and reposition patient  - Assist with mobility/ambulation  - Relieve pressure over bony prominences  - Avoid friction and shearing  - Provide appropriate hygiene as needed including keeping skin clean and dry  - Evaluate need for skin moisturizer/barrier cream  - Collaborate with interdisciplinary team   - Patient/family teaching  - Consider wound care consult   Outcome: Progressing     Problem: Nutrition/Hydration-ADULT  Goal: Nutrient/Hydration intake appropriate for improving, restoring or maintaining nutritional needs  Description: Monitor and assess patient's nutrition/hydration status for malnutrition  Collaborate with interdisciplinary team and initiate plan and interventions as ordered  Monitor patient's weight and dietary intake as ordered or per policy  Utilize nutrition screening tool and intervene as necessary  Determine patient's food preferences and provide high-protein, high-caloric foods as appropriate       INTERVENTIONS:  - Monitor oral intake, urinary output, labs, and treatment plans  - Assess nutrition and hydration status and recommend course of action  - Evaluate amount of meals eaten  - Assist patient with eating if necessary   - Allow adequate time for meals  - Recommend/ encourage appropriate diets, oral nutritional supplements, and vitamin/mineral supplements  - Order, calculate, and assess calorie counts as needed  - Recommend, monitor, and adjust tube feedings and TPN/PPN based on assessed needs  - Assess need for intravenous fluids  - Provide specific nutrition/hydration education as appropriate  - Include patient/family/caregiver in decisions related to nutrition  Outcome: Progressing     Problem: CARDIOVASCULAR - ADULT  Goal: Maintains optimal cardiac output and hemodynamic stability  Description: INTERVENTIONS:  - Monitor I/O, vital signs and rhythm  - Monitor for S/S and trends of decreased cardiac output  - Administer and titrate ordered vasoactive medications to optimize hemodynamic stability  - Assess quality of pulses, skin color and temperature  - Assess for signs of decreased coronary artery perfusion  - Instruct patient to report change in severity of symptoms  7/2/2021 0927 by Norma Douglas RN  Outcome: Progressing  7/2/2021 0927 by Norma Douglas RN  Outcome: Progressing  Goal: Absence of cardiac dysrhythmias or at baseline rhythm  Description: INTERVENTIONS:  - Continuous cardiac monitoring, vital signs, obtain 12 lead EKG if ordered  - Administer antiarrhythmic and heart rate control medications as ordered  - Monitor electrolytes and administer replacement therapy as ordered  7/2/2021 0927 by Norma Douglas RN  Outcome: Progressing  7/2/2021 0927 by Norma Douglas RN  Outcome: Progressing     Problem: RESPIRATORY - ADULT  Goal: Achieves optimal ventilation and oxygenation  Description: INTERVENTIONS:  - Assess for changes in respiratory status  - Assess for changes in mentation and behavior  - Position to facilitate oxygenation and minimize respiratory effort  - Oxygen administered by appropriate delivery if ordered  - Initiate smoking cessation education as indicated  - Encourage broncho-pulmonary hygiene including cough, deep breathe, Incentive Spirometry  - Assess the need for suctioning and aspirate as needed  - Assess and instruct to report SOB or any respiratory difficulty  - Respiratory Therapy support as indicated  Outcome: Progressing     Problem: GASTROINTESTINAL - ADULT  Goal: Maintains or returns to baseline bowel function  Description: INTERVENTIONS:  - Assess bowel function  - Encourage oral fluids to ensure adequate hydration  - Administer IV fluids if ordered to ensure adequate hydration  - Administer ordered medications as needed  - Encourage mobilization and activity  - Consider nutritional services referral to assist patient with adequate nutrition and appropriate food choices  Outcome: Progressing     Problem: METABOLIC, FLUID AND ELECTROLYTES - ADULT  Goal: Electrolytes maintained within normal limits  Description: INTERVENTIONS:  - Monitor labs and assess patient for signs and symptoms of electrolyte imbalances  - Administer electrolyte replacement as ordered  - Monitor response to electrolyte replacements, including repeat lab results as appropriate  - Instruct patient on fluid and nutrition as appropriate  Outcome: Progressing  Goal: Fluid balance maintained  Description: INTERVENTIONS:  - Monitor labs   - Monitor I/O and WT  - Instruct patient on fluid and nutrition as appropriate  - Assess for signs & symptoms of volume excess or deficit  Outcome: Progressing

## 2021-07-03 PROBLEM — E87.2 LACTIC ACIDOSIS: Status: RESOLVED | Noted: 2021-01-01 | Resolved: 2021-01-01

## 2021-07-03 NOTE — ASSESSMENT & PLAN NOTE
· In light of increased WBC, and PCT  · Initiated GN coverage and anaerobic coverage with Cefepime and Flagyl D#5 (started 6/30)

## 2021-07-03 NOTE — ASSESSMENT & PLAN NOTE
· Increased urine output to around 17 cc/hr   Was started on DDAVP  · Hypernatremia of 157 on AM labs      Plan:   · DDAVP discontinued  · Urine output has decreased to acceptable rate
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
## 2021-07-03 NOTE — PLAN OF CARE
Problem: Potential for Falls  Goal: Patient will remain free of falls  Description: INTERVENTIONS:  - Educate patient/family on patient safety including physical limitations  - Instruct patient to call for assistance with activity   - Consult OT/PT to assist with strengthening/mobility   - Keep Call bell within reach  - Keep bed low and locked with side rails adjusted as appropriate  - Keep care items and personal belongings within reach  - Initiate and maintain comfort rounds  - Make Fall Risk Sign visible to staff  - Apply yellow socks and bracelet for high fall risk patients  - Consider moving patient to room near nurses station  Outcome: Progressing     Problem: Prexisting or High Potential for Compromised Skin Integrity  Goal: Skin integrity is maintained or improved  Description: INTERVENTIONS:  - Identify patients at risk for skin breakdown  - Assess and monitor skin integrity  - Assess and monitor nutrition and hydration status  - Monitor labs   - Assess for incontinence   - Turn and reposition patient  - Assist with mobility/ambulation  - Relieve pressure over bony prominences  - Avoid friction and shearing  - Provide appropriate hygiene as needed including keeping skin clean and dry  - Evaluate need for skin moisturizer/barrier cream  - Collaborate with interdisciplinary team   - Patient/family teaching  - Consider wound care consult   Outcome: Progressing     Problem: Nutrition/Hydration-ADULT  Goal: Nutrient/Hydration intake appropriate for improving, restoring or maintaining nutritional needs  Description: Monitor and assess patient's nutrition/hydration status for malnutrition  Collaborate with interdisciplinary team and initiate plan and interventions as ordered  Monitor patient's weight and dietary intake as ordered or per policy  Utilize nutrition screening tool and intervene as necessary  Determine patient's food preferences and provide high-protein, high-caloric foods as appropriate  INTERVENTIONS:  - Monitor oral intake, urinary output, labs, and treatment plans  - Assess nutrition and hydration status and recommend course of action  - Evaluate amount of meals eaten  - Assist patient with eating if necessary   - Allow adequate time for meals  - Recommend/ encourage appropriate diets, oral nutritional supplements, and vitamin/mineral supplements  - Order, calculate, and assess calorie counts as needed  - Recommend, monitor, and adjust tube feedings and TPN/PPN based on assessed needs  - Assess need for intravenous fluids  - Provide specific nutrition/hydration education as appropriate  - Include patient/family/caregiver in decisions related to nutrition  Outcome: Progressing     Problem: CARDIOVASCULAR - ADULT  Goal: Maintains optimal cardiac output and hemodynamic stability  Description: INTERVENTIONS:  - Monitor I/O, vital signs and rhythm  - Monitor for S/S and trends of decreased cardiac output  - Administer and titrate ordered vasoactive medications to optimize hemodynamic stability  - Assess quality of pulses, skin color and temperature  - Assess for signs of decreased coronary artery perfusion  - Instruct patient to report change in severity of symptoms  Outcome: Progressing  Goal: Absence of cardiac dysrhythmias or at baseline rhythm  Description: INTERVENTIONS:  - Continuous cardiac monitoring, vital signs, obtain 12 lead EKG if ordered  - Administer antiarrhythmic and heart rate control medications as ordered  - Monitor electrolytes and administer replacement therapy as ordered  Outcome: Progressing     Problem: GENITOURINARY - ADULT  Goal: Maintains or returns to baseline urinary function  Description: INTERVENTIONS:  - Assess urinary function  - Encourage oral fluids to ensure adequate hydration if ordered  - Administer IV fluids as ordered to ensure adequate hydration  - Administer ordered medications as needed  - Offer frequent toileting  - Follow urinary retention protocol if ordered  Outcome: Progressing     Problem: METABOLIC, FLUID AND ELECTROLYTES - ADULT  Goal: Electrolytes maintained within normal limits  Description: INTERVENTIONS:  - Monitor labs and assess patient for signs and symptoms of electrolyte imbalances  - Administer electrolyte replacement as ordered  - Monitor response to electrolyte replacements, including repeat lab results as appropriate  - Instruct patient on fluid and nutrition as appropriate  Outcome: Progressing  Goal: Fluid balance maintained  Description: INTERVENTIONS:  - Monitor labs   - Monitor I/O and WT  - Instruct patient on fluid and nutrition as appropriate  - Assess for signs & symptoms of volume excess or deficit  Outcome: Progressing     Problem: SKIN/TISSUE INTEGRITY - ADULT  Goal: Skin Integrity remains intact(Skin Breakdown Prevention)  Description: Assess:  -Inspect skin when repositioning, toileting, and assisting with ADLS  -Assess extremities for adequate circulation and sensation     Bed Management:  -Have minimal linens on bed & keep smooth, unwrinkled  -Change linens as needed when moist or perspiring  -Avoid sitting or lying in one position for more than 2 hours while in bed  -Keep HOB at 30 degrees     Activity:  -Encourage or provide ROM exercises   -Turn and reposition patient every 2 Hours  -Use appropriate equipment to lift or move patient in bed    Skin Care:  -Avoid use of baby powder, tape, friction and shearing, hot water or constrictive clothing  -Relieve pressure over bony prominences using foam wedges  -Do not massage red bony areas      Outcome: Progressing

## 2021-07-03 NOTE — ASSESSMENT & PLAN NOTE
· In light of increased WBC, and PCT  · Initiated GN coverage and anaerobic coverage with Cefepime and Flagyl D#4 (started 6/30)

## 2021-07-03 NOTE — QUICK NOTE
I updated patient's sister Jada Chiu; overall no changes in neuro exam, over breathing vent, but no reflexes
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
## 2021-07-03 NOTE — ASSESSMENT & PLAN NOTE
· Currently on Levophed, phenylephrine, epinephrine, vasopressin  · Solucortef for possible adrenal infarct
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
## 2021-07-04 NOTE — PLAN OF CARE
Problem: Potential for Falls  Goal: Patient will remain free of falls  Description: INTERVENTIONS:  - Educate patient/family on patient safety including physical limitations  - Instruct patient to call for assistance with activity   - Consult OT/PT to assist with strengthening/mobility   - Keep Call bell within reach  - Keep bed low and locked with side rails adjusted as appropriate  - Keep care items and personal belongings within reach  - Initiate and maintain comfort rounds  - Make Fall Risk Sign visible to staff  - Apply yellow socks and bracelet for high fall risk patients  - Consider moving patient to room near nurses station  Outcome: Progressing     Problem: Prexisting or High Potential for Compromised Skin Integrity  Goal: Skin integrity is maintained or improved  Description: INTERVENTIONS:  - Identify patients at risk for skin breakdown  - Assess and monitor skin integrity  - Assess and monitor nutrition and hydration status  - Monitor labs   - Assess for incontinence   - Turn and reposition patient  - Assist with mobility/ambulation  - Relieve pressure over bony prominences  - Avoid friction and shearing  - Provide appropriate hygiene as needed including keeping skin clean and dry  - Evaluate need for skin moisturizer/barrier cream  - Collaborate with interdisciplinary team   - Patient/family teaching  - Consider wound care consult   Outcome: Progressing     Problem: Nutrition/Hydration-ADULT  Goal: Nutrient/Hydration intake appropriate for improving, restoring or maintaining nutritional needs  Description: Monitor and assess patient's nutrition/hydration status for malnutrition  Collaborate with interdisciplinary team and initiate plan and interventions as ordered  Monitor patient's weight and dietary intake as ordered or per policy  Utilize nutrition screening tool and intervene as necessary  Determine patient's food preferences and provide high-protein, high-caloric foods as appropriate  INTERVENTIONS:  - Monitor oral intake, urinary output, labs, and treatment plans  - Assess nutrition and hydration status and recommend course of action  - Evaluate amount of meals eaten  - Assist patient with eating if necessary   - Allow adequate time for meals  - Recommend/ encourage appropriate diets, oral nutritional supplements, and vitamin/mineral supplements  - Order, calculate, and assess calorie counts as needed  - Recommend, monitor, and adjust tube feedings and TPN/PPN based on assessed needs  - Assess need for intravenous fluids  - Provide specific nutrition/hydration education as appropriate  - Include patient/family/caregiver in decisions related to nutrition  Outcome: Progressing     Problem: CARDIOVASCULAR - ADULT  Goal: Maintains optimal cardiac output and hemodynamic stability  Description: INTERVENTIONS:  - Monitor I/O, vital signs and rhythm  - Monitor for S/S and trends of decreased cardiac output  - Administer and titrate ordered vasoactive medications to optimize hemodynamic stability  - Assess quality of pulses, skin color and temperature  - Assess for signs of decreased coronary artery perfusion  - Instruct patient to report change in severity of symptoms  Outcome: Progressing  Goal: Absence of cardiac dysrhythmias or at baseline rhythm  Description: INTERVENTIONS:  - Continuous cardiac monitoring, vital signs, obtain 12 lead EKG if ordered  - Administer antiarrhythmic and heart rate control medications as ordered  - Monitor electrolytes and administer replacement therapy as ordered  Outcome: Progressing     Problem: RESPIRATORY - ADULT  Goal: Achieves optimal ventilation and oxygenation  Description: INTERVENTIONS:  - Assess for changes in respiratory status  - Assess for changes in mentation and behavior  - Position to facilitate oxygenation and minimize respiratory effort  - Oxygen administered by appropriate delivery if ordered  - Initiate smoking cessation education as indicated  - Encourage broncho-pulmonary hygiene including cough, deep breathe, Incentive Spirometry  - Assess the need for suctioning and aspirate as needed  - Assess and instruct to report SOB or any respiratory difficulty  - Respiratory Therapy support as indicated  Outcome: Progressing     Problem: GENITOURINARY - ADULT  Goal: Maintains or returns to baseline urinary function  Description: INTERVENTIONS:  - Assess urinary function  - Encourage oral fluids to ensure adequate hydration if ordered  - Administer IV fluids as ordered to ensure adequate hydration  - Administer ordered medications as needed  - Offer frequent toileting  - Follow urinary retention protocol if ordered  Outcome: Progressing     Problem: METABOLIC, FLUID AND ELECTROLYTES - ADULT  Goal: Electrolytes maintained within normal limits  Description: INTERVENTIONS:  - Monitor labs and assess patient for signs and symptoms of electrolyte imbalances  - Administer electrolyte replacement as ordered  - Monitor response to electrolyte replacements, including repeat lab results as appropriate  - Instruct patient on fluid and nutrition as appropriate  Outcome: Progressing  Goal: Fluid balance maintained  Description: INTERVENTIONS:  - Monitor labs   - Monitor I/O and WT  - Instruct patient on fluid and nutrition as appropriate  - Assess for signs & symptoms of volume excess or deficit  Outcome: Progressing     Problem: SKIN/TISSUE INTEGRITY - ADULT  Goal: Skin Integrity remains intact(Skin Breakdown Prevention)  Description: Assess:  -Perform Qamar assessment every 24 hours  -Inspect skin when repositioning, toileting, and assisting with ADLS  -Assess extremities for adequate circulation and sensation     Bed Management:  -Have minimal linens on bed & keep smooth, unwrinkled  -Change linens as needed when moist or perspiring  -Keep HOB at 30 degrees     Activity:  -Encourage or provide ROM exercises   -Turn and reposition patient every 2 Hours  -Use appropriate equipment to lift or move patient in bed  Skin Care:  -Avoid use of baby powder, tape, friction and shearing, hot water or constrictive clothing  -Do not massage red bony areas    Outcome: Progressing

## 2021-07-04 NOTE — PROCEDURES
Arterial Line Insertion    Date/Time: 7/3/2021 8:40 PM  Performed by: CALLIE Quintana  Authorized by: CALLIE Quintana     Patient location:  ICU  Consent:     Consent obtained:  Emergent situation  Universal protocol:     Immediately prior to procedure a time out was called: yes      Patient identity confirmed:  Arm band  Indications:     Indications: continuous blood pressure monitoring and frequent labs / infusion    Pre-procedure details:     Skin preparation:  Chlorhexidine  Anesthesia (see MAR for exact dosages): Anesthesia method:  None  Procedure details:     Location / Tip of Catheter:  Radial    Laterality:  Left    Faizan's test performed: yes      Faizan's test abnormal: no      Needle gauge:  20 G    Placement technique:  Ultrasound guided    Ultrasound image availability:  Images available in PACS    Sterile ultrasound techniques: Sterile gel and sterile probe covers were used      Number of attempts:  1    Successful placement: yes      Transducer: waveform confirmed    Post-procedure details:     Post-procedure:  Sterile dressing applied and sutured    CMS:  Unable to assess    Patient tolerance of procedure:   Tolerated well, no immediate complications

## 2021-07-05 NOTE — QUICK NOTE
Called patient's sister Elicia Navarro and updated her on plan for brain death evaluation with cerebral flow scan  No questions at this time

## 2021-07-05 NOTE — ASSESSMENT & PLAN NOTE
· Increased urine output to around 17 cc/hr   Was started on DDAVP  · Hypernatremia of 157 on AM labs      Plan:   · DDAVP discontinued  · Urine output is currently at acceptable rate

## 2021-07-05 NOTE — PLAN OF CARE
Problem: Nutrition/Hydration-ADULT  Goal: Nutrient/Hydration intake appropriate for improving, restoring or maintaining nutritional needs  Description: Monitor and assess patient's nutrition/hydration status for malnutrition  Collaborate with interdisciplinary team and initiate plan and interventions as ordered  Monitor patient's weight and dietary intake as ordered or per policy  Utilize nutrition screening tool and intervene as necessary  Determine patient's food preferences and provide high-protein, high-caloric foods as appropriate  INTERVENTIONS:  - Monitor oral intake, urinary output, labs, and treatment plans  - Assess nutrition and hydration status and recommend course of action  - Evaluate amount of meals eaten  - Assist patient with eating if necessary   - Allow adequate time for meals  - Recommend/ encourage appropriate diets, oral nutritional supplements, and vitamin/mineral supplements  - Order, calculate, and assess calorie counts as needed  - Recommend, monitor, and adjust tube feedings and TPN/PPN based on assessed needs  - Assess need for intravenous fluids  - Provide specific nutrition/hydration education as appropriate  - Include patient/family/caregiver in decisions related to nutrition  Outcome: Progressing   PT currenlty meeting estimated pro, lelo fluid needs with current TF and D5  Tolerating TF  Will continue to monitor labs  Once Na stablizes and D5 is d/zechariah goal TF would be [email or web address], water flushes 250mL q 4hrs provides total of 1900cal, 87g pro, 2783mL
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
## 2021-07-05 NOTE — QUICK NOTE
Called patient's sisters to inform them on results of clinical exam, apnea test and nuclear flow study  All results were consistent with brain death  Will touch base with family again tomorrow

## 2021-07-05 NOTE — ASSESSMENT & PLAN NOTE
· In light of increased WBC, and PCT  · Initiated GN coverage and anaerobic coverage with Cefepime and Flagyl D#6 (started 6/30)   · Chest xr (7/4/2021): Interval significant worsening of diffuse bilateral airspace opacities    · Procalcitonin (7/4): 31 2    Plan:  · Continue with 7 day course of antibiotics   · Follow procalcitonin reflex

## 2021-07-05 NOTE — PROGRESS NOTES
Interval Progress Note - Critical Care   Josie Perez 49 y o  male MRN: [id number]  Unit/Bed#: ICU 12 Encounter: [id number]    [place] Brain Death Exam  Josie Perez 49 y o  male MRN: [id number]  Unit/Bed#: ICU 12 Encounter: [id number]    Medical prerequisites:    Core temperature >=36C (96 8F)    Temp (24hrs), Av 7 °F (35 9 °C), Min:95 °F (35 °C), Max:98 6 °F (37 °C)  Current: Temperature: 98 6 °F (37 °C)    Stable systolic BP >= 126 or MAP >60    Vitals:    21 0500 21 0600 21 0701 21 0800   BP:       BP Location:       Pulse: 78 84 82 82   Resp:    Temp: 97 5 °F (36 4 °C) 97 9 °F (36 6 °C) 98 2 °F (36 8 °C) 98 6 °F (37 °C)   TempSrc:   Bladder    SpO2: 98% 95% 98% 96%   Weight:       Height:         Arterial Line BP: 100/48  Arterial Line MAP (mmHg): 64 mmHg    Clinical Exam:    GCS: 3T  Motor response to pain:   No purposeful response to painful stimuli  Pupils: No pupillary response to bright light   Pupils 4-5 mm in size   Ocular Movement:    No corneal reflex present   No facial movement to painful stimuli   Pharyngeal and tracheal reflexes:   No response to stimulation of posterior pharynx with  tongue blade   No response to bronchial stimulation  Vestibular Reflexes:   No oculovestibular reflex present (caloric testing)   No oculocephalic reflex present (doll's eyes)  Apnea Testing (patient must be eucapnic to perform this exam)   Patient was placed on 100% FIO2 for 10 minutes prior to the test initiation   Baseline      Baseline ABG:   Lab Results   Component Value Date    PHART 7 353 2021    KSL3NTL 56 7 (HH) 2021    PO2ART 125 0 2021    UWB5QBG 30 8 (H) 2021    BEART 4 3 2021    SOURCE Line, Arterial 2021   ,     Apnea test was attempted    · Upon transition to 100% cannula through the endotracheal tube patient almost immediately became hemodynamically unstable with systolic blood pressure dropping to the 50s   · Apnea test was aborted due to intolerance  Unable to obtain ABG in a timely fashion  Was only on apnea testing for approximately 1 minute    Ancillary Tests to support Brain Death Diagnosis:    Nuclear flow study: No evidence of brain perfusion - consistent with the clinical examination  EEG:  Not performed    I testify that the clinical history, laboratory data, and medication administration record have been reviewed and there are no confounding factors that may be contributing to this patient's neurological exam findings        Date of death: July 5, 2021  Time death pronounced:  5:58 PM    SIGNATURE: MD Yaz Leach MD

## 2021-07-05 NOTE — PROGRESS NOTES
[address] [place] [address]  Progress Note Shaw Hernandez 1970, 49 y o  male MRN: [id number]  Unit/Bed#: ICU 12 Encounter: [id number]  Primary Care Provider: No primary care provider on file  Date and time admitted to hospital: 6/29/2021  5:41 PM    Anoxic brain injury [facility])  Assessment & Plan  · Patient presents s/p cardiac arrest after being pulled from a swimming pool, prolonged submersion  · CT head (6/30/21): Diffuse cerebral edema with transtentorial herniation, hydrocephalus and multiple infarcts  · Patient has negative gag reflex pupillary reflex and corneal reflex  · Last spontaneous breathing trial: patient not overbreathing ventilator    Plan:  · Will continue to monitor neurologic status    · Will repeat spontaneous breathing trial  If this continues to be negative, will proceed with brain death examination      Hypotension  Assessment & Plan  · Currently on Levophed, phenylephrine, vasopressin  · Solucortef for possible adrenal infarct      * Cardiac arrest [facility])  Assessment & Plan  · Unknown down time   · Patient was doing diving breathing exercises and had prolonged submersion  · Patient asystolic and apneic pulled from water with CPR initiated by   · Received 3 rounds of epinephrine and bicarb with return of spontaneous circulation at [facility] emergency department  · Patient was not cooled secondary to prolonged down time, poor prognostic indicators-coma/severe myoclonus/and multi system organ ischemic injuries  · ECHO EF 65%, no RWMA, RV mildly dilated, Peak PA pressure 42mm HG      Encephalopathy acute  Assessment & Plan  · Monitors neuro status closely  · Suspect related to anoxic/ischemic brain injury  · Poor prognosis       Myoclonus  Assessment & Plan  · Myoclonus versus seizure activity but favor myoclonus  · Loaded with keppra        DI [facility]ipid[facility]  Assessment & Plan  · Increased urine output to around 17 cc/hr   Was started on DDAVP  · Hypernatremia of 157 on AM labs      Plan:   · DDAVP discontinued  · Urine output is currently at acceptable rate     Transaminitis  Assessment & Plan  · Monitor LFTs  · This is likely secondary to cardiac arrest  · Continue to monitor     ATN (acute tubular necrosis) (HCC)  Assessment & Plan  · Monitor renal status and urine output closely  · Avoid nephrotoxic drugs      Aspiration into respiratory tract  Assessment & Plan  · In light of increased WBC, and PCT  · Initiated GN coverage and anaerobic coverage with Cefepime and Flagyl D#6 (started )   · Chest xr (2021): Interval significant worsening of diffuse bilateral airspace opacities  · Procalcitonin (): 31 2    Plan:  · Continue with 7 day course of antibiotics   · Follow procalcitonin reflex    HTN (hypertension)  Assessment & Plan  · BP has been labile  · Currently hypotensive, on pressors    ----------------------------------------------------------------------------------------  HPI/24hr events: Patient is not overbreathing ventilator on checks overnight  Disposition: Continue Critical Care   Code Status: Level 1 - Full Code  ---------------------------------------------------------------------------------------  SUBJECTIVE  Intubated  Anoxic brain injury  Review of Systems  Review of systems was unable to be performed secondary to intubation   Anoxic brain injury  ---------------------------------------------------------------------------------------  OBJECTIVE    Vitals   Vitals:    21 0400 21 0428 21 0500 21 0600   BP:       BP Location:       Pulse: 76  78 84   Resp: 12  12 12   Temp: (!) 96 4 °F (35 8 °C)  97 5 °F (36 4 °C) 97 9 °F (36 6 °C)   TempSrc:       SpO2: 97% 97% 98% 95%   Weight:       Height:         Temp (24hrs), Av 8 °F (36 °C), Min:95 °F (35 °C), Max:98 2 °F (36 8 °C)  Current: Temperature: 97 9 °F (36 6 °C)  Arterial Line BP: 98/46  Arterial Line MAP (mmHg): 62 mmHg    Respiratory:  SpO2: SpO2: 95 %       Invasive/non-invasive ventilation settings   Respiratory    Lab Data (Last 4 hours)    None         O2/Vent Data (Last 4 hours)      07/05 0428 07/05 0726         Vent Mode AC/VC AC/VC      Resp Rate (BPM) (BPM) 12 12      Vt (mL) (mL) 450 450      FIO2 (%) (%) 50 50      PEEP (cmH2O) (cmH2O) 6 6      MV 5 6 5 72                  Physical Exam  Constitutional:       Appearance: He is ill-appearing  Interventions: He is intubated  HENT:      Head: Normocephalic and atraumatic  Eyes:      Comments: Pupils fixed and dilated  Cardiovascular:      Rate and Rhythm: Normal rate and regular rhythm  Pulmonary:      Effort: Pulmonary effort is normal  He is intubated  Comments: Mechanical breath sounds  Abdominal:      General: Bowel sounds are normal       Palpations: Abdomen is soft  Musculoskeletal:         General: Swelling (pitting in the extremities) present  Skin:     General: Skin is warm and dry  Neurological:      Comments: No gag reflex  No pupillary reflex  Not overbreathing ventilator on examination  No spontaneous breaths taken      Psychiatric:      Comments: Unable to assess s/ anoxic brain injury         Laboratory and Diagnostics:  Results from last 7 days   Lab Units 07/04/21  0510 07/03/21  0634 07/02/21  0633 07/01/21  0500 06/30/21  1344 06/30/21  0508 06/29/21  1837 06/29/21  1417   WBC Thousand/uL 13 14* 10 79* 2 27* 8 07  --  13 05* 6 57 4 10*   HEMOGLOBIN g/dL 9 9* 11 2* 11 4* 11 5* 14 4 13 1 13 6 12 3*   HEMATOCRIT % 31 1* 34 6* 36 9 33 2*  --  37 7 39 3 41 0   PLATELETS Thousands/uL 123* 141* 158 149  --  186 221 185   BANDS PCT %  --   --   --   --   --   --  11*  --    MONO PCT %  --   --   --   --   --   --  1* 4     Results from last 7 days   Lab Units 07/05/21  0513 07/05/21  0013 07/04/21  1627 07/04/21  0510 07/03/21  2348 07/03/21  1723 07/03/21  0813 07/01/21  0500 06/30/21  4121 06/29/21  1837 06/29/21  1543 SODIUM mmol/L 159* 160* 159* 157* 156* 155* 153* 157* 135* 138 133*   POTASSIUM mmol/L 3 9 3 6 3 5 3 7 3 9 4 5 4 3 4 3 4 5 3 5 4 8   CHLORIDE mmol/L 122* 123* 123* 124* 122* 123* 121* 122* 101 103 101   CO2 mmol/L 33* 33* 31 27 27 26 22 27 24 26 15*   ANION GAP mmol/L 4 4 5 6 7 6 10 8 10 9 17*   BUN mg/dL 25 24 22 20 17 16 14 16 21 17 14   CREATININE mg/dL 1 31* 1 26 1 53* 1 56* 1 60* 1 64* 1 56* 1 57* 1 97* 1 66* 1 48   CALCIUM mg/dL 8 4 8 6 8 4 8 3 8 0* 8 0* 7 7* 7 7* 7 7* 7 8* 8 3*   GLUCOSE RANDOM mg/dL 137 125 147* 206* 211* 189* 169* 118 162* 103 244*   ALT U/L  --   --   --   --   --   --   --  343* 222* 240* 172*   AST U/L  --   --   --   --   --   --   --  415* 483* 400* 390*   ALK PHOS U/L  --   --   --   --   --   --   --  67 87 150* 106   ALBUMIN g/dL  --   --   --   --   --   --   --  2 2* 2 6* 2 7* 3 4   TOTAL BILIRUBIN mg/dL  --   --   --   --   --   --   --  0 60 0 79 0 81 1 44*     Results from last 7 days   Lab Units 07/03/21  0634 06/30/21 0508 06/29/21  1837   MAGNESIUM mg/dL 2 2 2 7* 3 4*   PHOSPHORUS mg/dL  --  5 1* 5 0*      Results from last 7 days   Lab Units 06/29/21 1837 06/29/21  1417   INR  1 48* 2 14*   PTT seconds 32 51*      Results from last 7 days   Lab Units 06/30/21 0508 06/29/21 2122 06/29/21 1837 06/29/21  1548   TROPONIN I ng/mL 2 76* 2 80* 1 61* 0 23*     Results from last 7 days   Lab Units 06/29/21 2122 06/29/21 1837 06/29/21  1659 06/29/21  1417   LACTIC ACID mmol/L 2 9* 4 4* 6 6* 23 0*     ABG:  Results from last 7 days   Lab Units 07/01/21  0500   PH ART  7 465*   PCO2 ART mm Hg 34 2*   PO2 ART mm Hg 139 7*   HCO3 ART mmol/L 24 1   BASE EXC ART mmol/L 0 7   ABG SOURCE  Line, Arterial     VBG:  Results from last 7 days   Lab Units 07/01/21  0500   ABG SOURCE  Line, Arterial     Results from last 7 days   Lab Units 07/04/21  0510 06/30/21  0508 06/29/21  1837   PROCALCITONIN ng/ml 31 20* 38 44* 3 77*       Micro  Results from last 7 days   Lab Units 06/29/21  1838 BLOOD CULTURE  No Growth After 5 Days  No Growth After 5 Days  EKG: Continuous cardiac monitoring   Imaging: I have personally reviewed pertinent reports  Intake and Output  I/O       07/03 0701 - 07/04 0700 07/04 0701 - 07/05 0700 07/05 0701 - 07/06 0700    I V  (mL/kg) 1801 5 (20 8) 1015 7 (11 4)     NG/GT  500     IV Piggyback 350 250     Feedings 340 2111     Total Intake(mL/kg) 2491 5 (28 8) 3876 7 (43 5)     Urine (mL/kg/hr) 2085 (1) 4270 (2)     Emesis/NG output       Stool 150      Total Output 2235 4270     Net +256 5 -393 3                  Height and Weights   Height: 6' (182 9 cm)  IBW (Ideal Body Weight): 77 6 kg  Body mass index is 26 67 kg/m²  Weight (last 2 days)     Date/Time   Weight    07/05/21 0304   89 2 (196 65)    07/04/21 0535   86 5 (190 7)    07/03/21 0600   71 (156 53)                Nutrition       Diet Orders   (From admission, onward)             Start     Ordered    07/04/21 0946  Diet Enteral/Parenteral; Tube Feeding No Oral Diet; Jevity 1 2 Ramu; Continuous; 60; 250; Water; Every 6 hours  Diet effective now     Question Answer Comment   Diet Type Enteral/Parenteral    Enteral/Parenteral Tube Feeding No Oral Diet    Tube Feeding Formula: Jevity 1 2 Ramu    Bolus/Cyclic/Continuous Continuous    Tube Feeding Goal Rate (mL/hr): 60    Tube Feeding flush (mL): 250    Water Flush type: Water    Water flush frequency: Every 6 hours    RD to adjust diet per protocol?  No        07/04/21 0946                  Active Medications  Scheduled Meds:  Current Facility-Administered Medications   Medication Dose Route Frequency Provider Last Rate    acetaminophen  650 mg Oral Q4H PRN CALLIE Costello      cefepime  2,000 mg Intravenous Q12H CALLIE Waggoner Stopped (07/05/21 0240)    chlorhexidine  15 mL Mouth/Throat Q12H MD Aiden      EPINEPHrine 3000 mcg (STANDARD CONCENTRATION) in sodium chloride 0 9% 250 mL  2 mcg/min Intravenous Titrated CALLIE Pichardo Stopped (07/02/21 2200)    glycerin-hypromellose-  2 drop Both Eyes Q4H PRN Norman Garcia DO      heparin (porcine)  5,000 Units Subcutaneous Q8H [address] Ivory Castano MD      hydrocortisone sodium succinate  50 mg Intravenous Q12H [address] Arie Moon MD      insulin lispro  2-12 Units Subcutaneous Q6H [address] Arie Moon MD      Labetalol HCl  10 mg Intravenous Q6H PRN Korina De León CRNP      levETIRAcetam  750 mg Intravenous Q12H [address] Evlygavin Gal, CRNP Stopped (07/04/21 2130)    metroNIDAZOLE  500 mg Intravenous [address] [place]  Mason Canales CRNP Stopped (07/05/21 0545)    norepinephrine  1-30 mcg/min Intravenous Titrated Vidal Quarry, CRNP 3 mcg/min (07/05/21 0604)    pantoprazole  40 mg Intravenous Q12H [address] CALLIE Pardo      phenylephine   mcg/min Intravenous Titrated Floydene Fass, CRNP Stopped (07/04/21 0733)    vasopressin  0 04 Units/min Intravenous Continuous Floydene Fass, CRNP 0 04 Units/min (07/05/21 0212)     Continuous Infusions:  EPINEPHrine 3000 mcg (STANDARD CONCENTRATION) in sodium chloride 0 9% 250 mL, 2 mcg/min, Last Rate: Stopped (07/02/21 2200)  norepinephrine, 1-30 mcg/min, Last Rate: 3 mcg/min (07/05/21 0604)  phenylephine,  mcg/min, Last Rate: Stopped (07/04/21 0733)  vasopressin, 0 04 Units/min, Last Rate: 0 04 Units/min (07/05/21 0212)      PRN Meds:   acetaminophen, 650 mg, Q4H PRN  glycerin-hypromellose-, 2 drop, Q4H PRN  Labetalol HCl, 10 mg, Q6H PRN        Invasive Devices Review  Invasive Devices     Central Venous Catheter Line            CVC Central Lines 06/29/21 Triple 20cm 5 days          Arterial Line            Arterial Line 07/03/21 Radial 1 day          Drain            NG/OG/Enteral Tube Nasogastric 16 Fr Right nares 5 days    Rectal Tube With balloon 5 days    Urethral Catheter Temperature probe 16 Fr  5 days          Airway            ETT  Cuffed 5 days ---------------------------------------------------------------------------------------  Advance Directive and Living Will:      Power of :    POLST:    ---------------------------------------------------------------------------------------  Care Time Delivered:   Please see attending attestation      Kimberly Ndiaye, DO      Portions of the record may have been created with voice recognition software  Occasional wrong word or "sound a like" substitutions may have occurred due to the inherent limitations of voice recognition software    Read the chart carefully and recognize, using context, where substitutions have occurred

## 2021-07-05 NOTE — PLAN OF CARE
Problem: Potential for Falls  Goal: Patient will remain free of falls  Description: INTERVENTIONS:  - Educate patient/family on patient safety including physical limitations  - Instruct patient to call for assistance with activity   - Consult OT/PT to assist with strengthening/mobility   - Keep Call bell within reach  - Keep bed low and locked with side rails adjusted as appropriate  - Keep care items and personal belongings within reach  - Initiate and maintain comfort rounds  - Make Fall Risk Sign visible to staff  - Obtain necessary fall risk management equipment:   Problem: Prexisting or High Potential for Compromised Skin Integrity  Goal: Skin integrity is maintained or improved  Description: INTERVENTIONS:  - Identify patients at risk for skin breakdown  - Assess and monitor skin integrity  - Assess and monitor nutrition and hydration status  - Monitor labs   - Assess for incontinence   - Turn and reposition patient  - Assist with mobility/ambulation  - Relieve pressure over bony prominences  - Avoid friction and shearing  - Provide appropriate hygiene as needed including keeping skin clean and dry  - Evaluate need for skin moisturizer/barrier cream  - Collaborate with interdisciplinary team   - Patient/family teaching  - Consider wound care consult   Outcome: Progressing     Problem: Nutrition/Hydration-ADULT  Goal: Nutrient/Hydration intake appropriate for improving, restoring or maintaining nutritional needs  Description: Monitor and assess patient's nutrition/hydration status for malnutrition  Collaborate with interdisciplinary team and initiate plan and interventions as ordered  Monitor patient's weight and dietary intake as ordered or per policy  Utilize nutrition screening tool and intervene as necessary  Determine patient's food preferences and provide high-protein, high-caloric foods as appropriate       INTERVENTIONS:  - Monitor oral intake, urinary output, labs, and treatment plans  - Assess nutrition and hydration status and recommend course of action  - Evaluate amount of meals eaten  - Assist patient with eating if necessary   - Allow adequate time for meals  - Recommend/ encourage appropriate diets, oral nutritional supplements, and vitamin/mineral supplements  - Order, calculate, and assess calorie counts as needed  - Recommend, monitor, and adjust tube feedings and TPN/PPN based on assessed needs  - Assess need for intravenous fluids  - Provide specific nutrition/hydration education as appropriate  - Include patient/family/caregiver in decisions related to nutrition  Outcome: Progressing     Problem: NEUROSENSORY - ADULT  Goal: Achieves stable or improved neurological status  Description: INTERVENTIONS  - Monitor and report changes in neurological status  - Monitor vital signs such as temperature, blood pressure, glucose, and any other labs ordered   - Initiate measures to prevent increased intracranial pressure  - Monitor for seizure activity and implement precautions if appropriate      Outcome: Progressing     Problem: CARDIOVASCULAR - ADULT  Goal: Maintains optimal cardiac output and hemodynamic stability  Description: INTERVENTIONS:  - Monitor I/O, vital signs and rhythm  - Monitor for S/S and trends of decreased cardiac output  - Administer and titrate ordered vasoactive medications to optimize hemodynamic stability  - Assess quality of pulses, skin color and temperature  - Assess for signs of decreased coronary artery perfusion  - Instruct patient to report change in severity of symptoms  Outcome: Progressing  Goal: Absence of cardiac dysrhythmias or at baseline rhythm  Description: INTERVENTIONS:  - Continuous cardiac monitoring, vital signs, obtain 12 lead EKG if ordered  - Administer antiarrhythmic and heart rate control medications as ordered  - Monitor electrolytes and administer replacement therapy as ordered  Outcome: Progressing     Problem: RESPIRATORY - ADULT  Goal: Achieves optimal ventilation and oxygenation  Description: INTERVENTIONS:  - Assess for changes in respiratory status  - Assess for changes in mentation and behavior  - Position to facilitate oxygenation and minimize respiratory effort  - Oxygen administered by appropriate delivery if ordered  - Initiate smoking cessation education as indicated  - Encourage broncho-pulmonary hygiene including cough, deep breathe, Incentive Spirometry  - Assess the need for suctioning and aspirate as needed  - Assess and instruct to report SOB or any respiratory difficulty  - Respiratory Therapy support as indicated  Outcome: Progressing     Problem: GASTROINTESTINAL - ADULT  Goal: Maintains or returns to baseline bowel function  Description: INTERVENTIONS:  - Assess bowel function  - Encourage oral fluids to ensure adequate hydration  - Administer IV fluids if ordered to ensure adequate hydration  - Administer ordered medications as needed  - Encourage mobilization and activity  - Consider nutritional services referral to assist patient with adequate nutrition and appropriate food choices  Outcome: Progressing     Problem: GENITOURINARY - ADULT  Goal: Maintains or returns to baseline urinary function  Description: INTERVENTIONS:  - Assess urinary function  - Encourage oral fluids to ensure adequate hydration if ordered  - Administer IV fluids as ordered to ensure adequate hydration  - Administer ordered medications as needed  - Offer frequent toileting  - Follow urinary retention protocol if ordered  Outcome: Progressing     - Apply yellow socks and bracelet for high fall risk patients  - Consider moving patient to room near nurses station  Outcome: Progressing

## 2021-07-06 VITALS
WEIGHT: 186.51 LBS | OXYGEN SATURATION: 94 % | HEIGHT: 72 IN | HEART RATE: 102 BPM | SYSTOLIC BLOOD PRESSURE: 161 MMHG | BODY MASS INDEX: 25.26 KG/M2 | RESPIRATION RATE: 17 BRPM | TEMPERATURE: 101.1 F | DIASTOLIC BLOOD PRESSURE: 82 MMHG

## 2021-07-06 LAB
ALBUMIN SERPL BCP-MCNC: 1.8 G/DL (ref 3.5–5)
ALP SERPL-CCNC: 163 U/L (ref 46–116)
ALT SERPL W P-5'-P-CCNC: 100 U/L (ref 12–78)
ANION GAP SERPL CALCULATED.3IONS-SCNC: 1 MMOL/L (ref 4–13)
ANION GAP SERPL CALCULATED.3IONS-SCNC: 4 MMOL/L (ref 4–13)
AST SERPL W P-5'-P-CCNC: 234 U/L (ref 5–45)
BILIRUB DIRECT SERPL-MCNC: 0.13 MG/DL (ref 0–0.2)
BILIRUB SERPL-MCNC: 0.37 MG/DL (ref 0.2–1)
BUN SERPL-MCNC: 33 MG/DL (ref 5–25)
BUN SERPL-MCNC: 34 MG/DL (ref 5–25)
CALCIUM SERPL-MCNC: 8.1 MG/DL (ref 8.3–10.1)
CALCIUM SERPL-MCNC: 8.6 MG/DL (ref 8.3–10.1)
CHLORIDE SERPL-SCNC: 115 MMOL/L (ref 100–108)
CHLORIDE SERPL-SCNC: 116 MMOL/L (ref 100–108)
CO2 SERPL-SCNC: 37 MMOL/L (ref 21–32)
CO2 SERPL-SCNC: 39 MMOL/L (ref 21–32)
CREAT SERPL-MCNC: 1.3 MG/DL (ref 0.6–1.3)
CREAT SERPL-MCNC: 1.39 MG/DL (ref 0.6–1.3)
ERYTHROCYTE [DISTWIDTH] IN BLOOD BY AUTOMATED COUNT: 12.8 % (ref 11.6–15.1)
GFR SERPL CREATININE-BSD FRML MDRD: 59 ML/MIN/1.73SQ M
GFR SERPL CREATININE-BSD FRML MDRD: 64 ML/MIN/1.73SQ M
GLUCOSE SERPL-MCNC: 151 MG/DL (ref 65–140)
GLUCOSE SERPL-MCNC: 159 MG/DL (ref 65–140)
GLUCOSE SERPL-MCNC: 174 MG/DL (ref 65–140)
HCT VFR BLD AUTO: 29.6 % (ref 36.5–49.3)
HGB BLD-MCNC: 9.2 G/DL (ref 12–17)
MCH RBC QN AUTO: 31.3 PG (ref 26.8–34.3)
MCHC RBC AUTO-ENTMCNC: 31.1 G/DL (ref 31.4–37.4)
MCV RBC AUTO: 101 FL (ref 82–98)
PLATELET # BLD AUTO: 113 THOUSANDS/UL (ref 149–390)
PMV BLD AUTO: 10.6 FL (ref 8.9–12.7)
POTASSIUM SERPL-SCNC: 3.3 MMOL/L (ref 3.5–5.3)
POTASSIUM SERPL-SCNC: 3.4 MMOL/L (ref 3.5–5.3)
PROT SERPL-MCNC: 6 G/DL (ref 6.4–8.2)
RBC # BLD AUTO: 2.94 MILLION/UL (ref 3.88–5.62)
SODIUM SERPL-SCNC: 156 MMOL/L (ref 136–145)
SODIUM SERPL-SCNC: 156 MMOL/L (ref 136–145)
WBC # BLD AUTO: 12.52 THOUSAND/UL (ref 4.31–10.16)

## 2021-07-06 PROCEDURE — NC001 PR NO CHARGE: Performed by: INTERNAL MEDICINE

## 2021-07-06 PROCEDURE — 82948 REAGENT STRIP/BLOOD GLUCOSE: CPT

## 2021-07-06 PROCEDURE — 80076 HEPATIC FUNCTION PANEL: CPT | Performed by: PHYSICIAN ASSISTANT

## 2021-07-06 PROCEDURE — 80048 BASIC METABOLIC PNL TOTAL CA: CPT | Performed by: INTERNAL MEDICINE

## 2021-07-06 PROCEDURE — 85027 COMPLETE CBC AUTOMATED: CPT | Performed by: INTERNAL MEDICINE

## 2021-07-06 PROCEDURE — 94003 VENT MGMT INPAT SUBQ DAY: CPT

## 2021-07-06 PROCEDURE — 99232 SBSQ HOSP IP/OBS MODERATE 35: CPT | Performed by: INTERNAL MEDICINE

## 2021-07-06 PROCEDURE — C9113 INJ PANTOPRAZOLE SODIUM, VIA: HCPCS | Performed by: NURSE PRACTITIONER

## 2021-07-06 RX ORDER — POTASSIUM CHLORIDE 14.9 MG/ML
20 INJECTION INTRAVENOUS ONCE
Status: COMPLETED | OUTPATIENT
Start: 2021-07-06 | End: 2021-07-06

## 2021-07-06 RX ADMIN — PANTOPRAZOLE SODIUM 40 MG: 40 INJECTION, POWDER, FOR SOLUTION INTRAVENOUS at 08:29

## 2021-07-06 RX ADMIN — DEXTROSE 100 ML/HR: 5 SOLUTION INTRAVENOUS at 05:14

## 2021-07-06 RX ADMIN — INSULIN LISPRO 2 UNITS: 100 INJECTION, SOLUTION INTRAVENOUS; SUBCUTANEOUS at 05:43

## 2021-07-06 RX ADMIN — HYDROCORTISONE SODIUM SUCCINATE 50 MG: 100 INJECTION, POWDER, FOR SOLUTION INTRAMUSCULAR; INTRAVENOUS at 08:29

## 2021-07-06 RX ADMIN — CHLORHEXIDINE GLUCONATE 0.12% ORAL RINSE 15 ML: 1.2 LIQUID ORAL at 08:28

## 2021-07-06 RX ADMIN — CEFEPIME HYDROCHLORIDE 2000 MG: 2 INJECTION, POWDER, FOR SOLUTION INTRAVENOUS at 02:11

## 2021-07-06 RX ADMIN — POTASSIUM CHLORIDE 20 MEQ: 14.9 INJECTION, SOLUTION INTRAVENOUS at 08:29

## 2021-07-06 RX ADMIN — FUROSEMIDE 40 MG: 10 INJECTION, SOLUTION INTRAVENOUS at 08:29

## 2021-07-06 RX ADMIN — METRONIDAZOLE 500 MG: 500 INJECTION, SOLUTION INTRAVENOUS at 05:42

## 2021-07-06 RX ADMIN — HEPARIN SODIUM 5000 UNITS: 5000 INJECTION INTRAVENOUS; SUBCUTANEOUS at 05:43

## 2021-07-06 RX ADMIN — VASOPRESSIN 0.04 UNITS/MIN: 20 INJECTION INTRAVENOUS at 01:43

## 2021-07-06 NOTE — ASSESSMENT & PLAN NOTE
· Unknown down time   · Patient was doing diving breathing exercises and had prolonged submersion  · Patient asystolic and apneic pulled from water with CPR initiated by   · Received 3 rounds of epinephrine and bicarb with return of spontaneous circulation at [facility] [place] [facility] emergency department    · Patient was not cooled secondary to prolonged down time, poor prognostic indicators-coma/severe myoclonus/and multi system organ ischemic injuries  · ECHO EF 65%, no RWMA, RV mildly dilated, Peak PA pressure 42mm HG
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
## 2021-07-06 NOTE — ASSESSMENT & PLAN NOTE
· Renal status and urine output were followed   · Nephrotoxic drugs avoided  · Brain death has been declared

## 2021-07-06 NOTE — PROGRESS NOTES
overnight for increased urine output    Transaminitis  Assessment & Plan  · Monitor LFTs  · This is likely secondary to cardiac arrest  · Continue to monitor     ATN (acute tubular necrosis) (HCC)  Assessment & Plan  · Monitor renal status and urine output closely  · Avoid nephrotoxic drugs      Aspiration into respiratory tract  Assessment & Plan  · In light of increased WBC, and PCT  · Initiated GN coverage and anaerobic coverage with Cefepime and Flagyl D#6 (started )   · Chest xr (2021): Interval significant worsening of diffuse bilateral airspace opacities  · Procalcitonin (): 31 2 --> 17 19    Plan:  · Continue with 7 day course of antibiotics   · Awaiting decision planning today given brain death status      HTN (hypertension)  Assessment & Plan  · BP has been labile  · Can add PRN anti-hypertensive if needed    ----------------------------------------------------------------------------------------  HPI/24hr events: Remains on ventilator  Cerebral vascular flow scan showed no blood flow or brain perfusion  ICU attending performed brain death examination  Disposition: Continue Critical Care   Code Status: Level 1 - Full Code  ---------------------------------------------------------------------------------------  SUBJECTIVE  Intubated  Review of Systems  Review of systems was unable to be performed secondary to intubation   Brain death   ---------------------------------------------------------------------------------------  OBJECTIVE    Vitals   Vitals:    21 0334 21 0400 21 0500 21 0600   BP:       BP Location:       Pulse:  96 (!) 116 (!) 108   Resp:  18 17 17   Temp:  98 6 °F (37 °C) 98 6 °F (37 °C) 98 6 °F (37 °C)   TempSrc:       SpO2: 98% 97% 95% 96%   Weight:    84 6 kg (186 lb 8 2 oz)   Height:         Temp (24hrs), Av 8 °F (37 1 °C), Min:98 2 °F (36 8 °C), Max:100 °F (37 8 °C)  Current: Temperature: 98 6 °F (37 °C)  Arterial Line BP: 126/56  Arterial Line MAP (mmHg): 76 mmHg    Respiratory:  SpO2: SpO2: 96 %       Invasive/non-invasive ventilation settings   Respiratory    Lab Data (Last 4 hours)    None         O2/Vent Data (Last 4 hours)      07/06 0700           Vent Mode AC/VC       Resp Rate (BPM) (BPM) 18       Vt (mL) (mL) 450       FIO2 (%) (%) 60       PEEP (cmH2O) (cmH2O) 6       Patient safety screen outcome: Failed       MV 8 74                   Physical Exam  Constitutional:       Interventions: He is intubated  Cervical collar in place  HENT:      Mouth/Throat:      Comments: NG tube in place  Cardiovascular:      Rate and Rhythm: Normal rate and regular rhythm  Pulmonary:      Effort: Pulmonary effort is normal  He is intubated  Breath sounds: Rhonchi present  Comments: Intubated  Mechanical breath sounds  Abdominal:      General: There is no distension  Palpations: Abdomen is soft  Musculoskeletal:         General: Swelling present  Right lower leg: Edema present  Left lower leg: Edema present  Skin:     General: Skin is warm and dry  Neurological:      Comments: No gag reflex  No pupil reflex  No response to noxious stimuli  Psychiatric:      Comments: Unable to assess           Laboratory and Diagnostics:  Results from last 7 days   Lab Units 07/06/21  0503 07/04/21  0510 07/03/21  6865 07/02/21  2628 07/01/21  0500 06/30/21  1344 06/30/21  0508 06/29/21  1837 06/29/21  1417   WBC Thousand/uL 12 52* 13 14* 10 79* 2 27* 8 07  --  13 05* 6 57 4 10*   HEMOGLOBIN g/dL 9 2* 9 9* 11 2* 11 4* 11 5* 14 4 13 1 13 6 12 3*   HEMATOCRIT % 29 6* 31 1* 34 6* 36 9 33 2*  --  37 7 39 3 41 0   PLATELETS Thousands/uL 113* 123* 141* 158 149  --  186 221 185   BANDS PCT %  --   --   --   --   --   --   --  11*  --    MONO PCT %  --   --   --   --   --   --   --  1* 4     Results from last 7 days   Lab Units 07/06/21  0503 07/05/21  2350 07/05/21  1833 07/05/21  1308 07/05/21  0513 07/05/21  0013 07/04/21  1627 07/04/21  0510 07/01/21  0500 06/30/21  0508 06/29/21  1837 06/29/21  1549   SODIUM mmol/L 156* 156* 158*  --  159* 160* 159* 157* 157* 135* 138 133*   POTASSIUM mmol/L 3 3* 3 4* 3 5  --  3 9 3 6 3 5 3 7 4 3 4 5 3 5 4 8   CHLORIDE mmol/L 115* 116* 117*  --  122* 123* 123* 124* 122* 101 103 101   CO2 mmol/L 37* 39* 36*  --  33* 33* 31 27 27 24 26 15*   ANION GAP mmol/L 4 1* 5  --  4 4 5 6 8 10 9 17*   BUN mg/dL 34* 33* 33*  --  25 24 22 20 16 21 17 14   CREATININE mg/dL 1 39* 1 30 1 38*  --  1 31* 1 26 1 53* 1 56* 1 57* 1 97* 1 66* 1 48   CALCIUM mg/dL 8 6 8 1* 8 7  --  8 4 8 6 8 4 8 3 7 7* 7 7* 7 8* 8 3*   GLUCOSE RANDOM mg/dL 174* 159* 129  --  137 125 147* 206* 118 162* 103 244*   ALT U/L 100*  --   --  109*  --   --   --   --  343* 222* 240* 172*   AST U/L 234*  --   --  170*  --   --   --   --  415* 483* 400* 390*   ALK PHOS U/L 163*  --   --  141*  --   --   --   --  67 87 150* 106   ALBUMIN g/dL 1 8*  --   --  1 6*  --   --   --   --  2 2* 2 6* 2 7* 3 4   TOTAL BILIRUBIN mg/dL 0 37  --   --  0 26  --   --   --   --  0 60 0 79 0 81 1 44*     Results from last 7 days   Lab Units 07/03/21  0634 06/30/21  0508 06/29/21  1837   MAGNESIUM mg/dL 2 2 2 7* 3 4*   PHOSPHORUS mg/dL  --  5 1* 5 0*      Results from last 7 days   Lab Units 06/29/21  1837 06/29/21  1417   INR  1 48* 2 14*   PTT seconds 32 51*      Results from last 7 days   Lab Units 06/30/21  0508 06/29/21 2122 06/29/21  1837 06/29/21  1548   TROPONIN I ng/mL 2 76* 2 80* 1 61* 0 23*     Results from last 7 days   Lab Units 06/29/21 2122 06/29/21 1837 06/29/21  1659 06/29/21  1417   LACTIC ACID mmol/L 2 9* 4 4* 6 6* 23 0*     ABG:  Results from last 7 days   Lab Units 07/05/21  1307   PH ART  7 353   PCO2 ART mm Hg 56 7*   PO2 ART mm Hg 125 0   HCO3 ART mmol/L 30 8*   BASE EXC ART mmol/L 4 3   ABG SOURCE  Line, Arterial     VBG:  Results from last 7 days   Lab Units 07/05/21  1307   ABG SOURCE  Line, Arterial     Results from last 7 days   Lab Units 07/05/21  0513 07/04/21  0510 06/30/21  0508 06/29/21  1837   PROCALCITONIN ng/ml 17 19* 31 20* 38 44* 3 77*       Micro  Results from last 7 days   Lab Units 06/29/21  1838   BLOOD CULTURE  No Growth After 5 Days  No Growth After 5 Days  EKG: Continuous cardiac monitoring  Imaging: I have personally reviewed pertinent reports  Intake and Output  I/O       07/04 0701 - 07/05 0700 07/05 0701 - 07/06 0700 07/06 0701 - 07/07 0700    I V  (mL/kg) 1015 7 (11 4) 2563 4 (30 3)     NG/ 750     IV Piggyback 250 450     Feedings 2111 1091     Total Intake(mL/kg) 3876 7 (43 5) 4854 4 (57 4)     Urine (mL/kg/hr) 4270 (2) 7775 (3 8)     Stool       Total Output 4270 7775     Net -393 3 -2920 7                  Height and Weights   Height: 6' (182 9 cm)  IBW (Ideal Body Weight): 77 6 kg  Body mass index is 25 3 kg/m²  Weight (last 2 days)     Date/Time   Weight    07/06/21 0600   84 6 (186 51)    07/05/21 0304   89 2 (196 65)    07/04/21 0535   86 5 (190 7)                Nutrition       Diet Orders   (From admission, onward)             Start     Ordered    07/05/21 0757  Diet Enteral/Parenteral; Tube Feeding No Oral Diet; Jevity 1 2 Ramu; Continuous; 60; 250; Water; Every 4 hours  Diet effective now     Question Answer Comment   Diet Type Enteral/Parenteral    Enteral/Parenteral Tube Feeding No Oral Diet    Tube Feeding Formula: Jevity 1 2 Ramu    Bolus/Cyclic/Continuous Continuous    Tube Feeding Goal Rate (mL/hr): 60    Tube Feeding flush (mL): 250    Water Flush type: Water    Water flush frequency: Every 4 hours    RD to adjust diet per protocol?  No        07/05/21 0757                  Active Medications  Scheduled Meds:  Current Facility-Administered Medications   Medication Dose Route Frequency Provider Last Rate    acetaminophen  650 mg Oral Q4H PRN CALLIE Louis      cefepime  2,000 mg Intravenous Q12H CALLIE Hollingsworth Stopped (07/06/21 0250)    chlorhexidine  15 mL Mouth/Throat Q12H [facility] Chitra Ceballos MD      dextrose  100 mL/hr Intravenous Continuous Guy Lin  mL/hr (07/06/21 0514)    EPINEPHrine 3000 mcg (STANDARD CONCENTRATION) in sodium chloride 0 9% 250 mL  2 mcg/min Intravenous Titrated CALLIE Tolentino Stopped (07/02/21 2200)    furosemide  40 mg Intravenous BID (diuretic) Dawson Adler MD      glycerin-hypromellose-  2 drop Both Eyes Q4H PRN Linda Nuñez DO      heparin (porcine)  5,000 Units Subcutaneous Q8H [address] Chitra Ceballos MD      hydrocortisone sodium succinate  50 mg Intravenous Q12H [address] Guy Lin MD      insulin lispro  2-12 Units Subcutaneous Q6H [address] Guy Lin MD      Labetalol HCl  10 mg Intravenous Q6H PRN CALLIE Powers      levETIRAcetam  750 mg Intravenous Q12H [address] CALLIE Najera Stopped (07/05/21 2136)    metroNIDAZOLE  500 mg Intravenous Q8H CALLIE Powers 500 mg (07/06/21 0542)    norepinephrine  1-30 mcg/min Intravenous Titrated CALLIE Dc Stopped (07/05/21 0902)    pantoprazole  40 mg Intravenous Q12H [address] CALLIE Powers      phenylephine   mcg/min Intravenous Titrated CALLIE Tolentino Stopped (07/04/21 0733)    potassium chloride  20 mEq Intravenous Once Linda Nuñez DO      vasopressin  0 04 Units/min Intravenous Continuous CALLIE Tolentino Stopped (07/06/21 0253)     Continuous Infusions:  dextrose, 100 mL/hr, Last Rate: 100 mL/hr (07/06/21 0514)  EPINEPHrine 3000 mcg (STANDARD CONCENTRATION) in sodium chloride 0 9% 250 mL, 2 mcg/min, Last Rate: Stopped (07/02/21 2200)  norepinephrine, 1-30 mcg/min, Last Rate: Stopped (07/05/21 0902)  phenylephine,  mcg/min, Last Rate: Stopped (07/04/21 0733)  vasopressin, 0 04 Units/min, Last Rate: Stopped (07/06/21 0253)      PRN Meds:   acetaminophen, 650 mg, Q4H PRN  glycerin-hypromellose-, 2 drop, Q4H PRN  Labetalol HCl, 10 mg, Q6H PRN        Invasive Devices Review  Invasive Devices     Central Venous Catheter Line CVC Central Lines 06/29/21 Triple 20cm 6 days          Arterial Line            Arterial Line 07/03/21 Radial 2 days          Drain            NG/OG/Enteral Tube Nasogastric 16 Fr Right nares 6 days    Rectal Tube With balloon 6 days    Urethral Catheter Temperature probe 16 Fr  6 days          Airway            ETT  Cuffed 6 days              ---------------------------------------------------------------------------------------  Advance Directive and Living Will:      Power of :    POLST:    ---------------------------------------------------------------------------------------  Care Time Delivered:   Please see attending attestation  Royal An, DO      Portions of the record may have been created with voice recognition software  Occasional wrong word or "sound a like" substitutions may have occurred due to the inherent limitations of voice recognition software    Read the chart carefully and recognize, using context, where substitutions have occurred

## 2021-07-06 NOTE — ASSESSMENT & PLAN NOTE
· Currently normotensive     · Pressors held at this time  · Solucortef for possible adrenal infarct

## 2021-07-06 NOTE — QUICK NOTE
After discussion with [employer] representative and witnessed by Davida Smith, the sister Archana Casper was not able to consent to organ donation at this time  The ventilator was turned off and the tube was disconnected from the circuit  Please see progress note dated 7/5/2021 at [facility] by Dr Eda Hernandez for time of death declaration      The patient will be transferred to the [facility] office for autopsy and determination of cause of death evaluation
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
## 2021-07-06 NOTE — NURSING NOTE
Received report from previous RN  Assumed care of pt  Pt was found in bed  VSS at this time  Q2H turn  Will continue to monitor

## 2021-07-06 NOTE — UTILIZATION REVIEW
Continued Stay Review    Date:      FOR  7/4/21                          Current Patient Class:   Inpatient  Current Level of Care:   ICU    HPI:51 y o  male initially admitted on    6/29  With  Cardiac  Arrest, likely  Anoxic brain injury, unknown down time, acute encephalopathy     Assessment/Plan:   7/4  Neuro  Exam  Unchanged  Still with spontaneous respirations  Plan to diurese  Has had a  Variable pressor requirement, still with hemodynamic variability  Stays  Off   DDAVP, off IVF  On  Tube  Feeds  Continue to monitor  Labs  Continue  IV cefepime  Continue t o monitor neuro status  Family wishes to continue aggressive care  Per  Unofficial neuro consult, no expectation for meaningful recovery      Vital Signs:   /04/21 2300  96 4 °F (35 8 °C)Abnormal   74  12  136/72  96 mmHg  98 %  --  Ventilator   07/04/21 2230  96 4 °F (35 8 °C)Abnormal   72  12  118/60  80 mmHg  98 %  --  --   07/04/21 2200  96 4 °F (35 8 °C)Abnormal   74  12  110/56  74 mmHg  98 %  --  --   07/04/21 2130  96 4 °F (35 8 °C)Abnormal   74  12  110/56  74 mmHg  98 %  --  --   07/04/21 2100  96 8 °F (36 °C)Abnormal   76  12  120/60  80 mmHg  98 %  --  --   07/04/21 2030  96 4 °F (35 8 °C)Abnormal   76  12  106/52  70 mmHg  98 %  --  --   07/04/21 2010  --  --  --  --  --  97 %  --  --   07/04/21 2000  96 4 °F (35 8 °C)Abnormal   80  13  130/62  86 mmHg  97 %  --  --   07/04/21 1930  96 4 °F (35 8 °C)Abnormal   80  12  112/54  72 mmHg  97 %  --  --   07/04/21 1914  96 4 °F (35 8 °C)Abnormal   80  12  120/58  78 mmHg  97 %  --  Ventilator   07/04/21 1900  96 8 °F (36 °C)Abnormal   78  12  92/46  62 mmHg  97 %  --  --   07/04/21 1800  96 8 °F (36 °C)Abnormal   80  12  108/54  72 mmHg  97 %  --  --   07/04/21 1700  96 8 °F (36 °C)Abnormal   78  12  94/48  62 mmHg  97 %  --  --   07/04/21 1600  97 5 °F (36 4 °C)  90  12  106/54  72 mmHg  95 %  --  --   07/04/21 1550  97 5 °F (36 4 °C)  84  12  92/50  64 mmHg  96 %  --  --   07/04/21 1529  --  88  13  103/48  66 mmHg  97 %           Pertinent Labs/Diagnostic Results:   CXR  ( 7/5)     Interval significant worsening of diffuse bilateral airspace opacities  NM brain scan ( 7/5)     No evidence of blood flow nor perfusion to the brain     Results from last 7 days   Lab Units 06/29/21  1534   SARS-COV-2  Negative     Results from last 7 days   Lab Units 07/06/21  0503 07/04/21  0510 07/03/21  0634 07/02/21  0633 07/01/21  0500 06/29/21  1837 06/29/21  1417   WBC Thousand/uL 12 52* 13 14* 10 79* 2 27* 8 07 6 57 4 10*   HEMOGLOBIN g/dL 9 2* 9 9* 11 2* 11 4* 11 5* 13 6 12 3*   HEMATOCRIT % 29 6* 31 1* 34 6* 36 9 33 2* 39 3 41 0   PLATELETS Thousands/uL 113* 123* 141* 158 149 221 185   TOTAL NEUT ABS Thousand/uL  --   --   --   --   --   --  0 29*   BANDS PCT %  --   --   --   --   --  11*  --          Results from last 7 days   Lab Units 07/06/21  0503 07/05/21  2350 07/05/21  1833 07/05/21  0513 07/05/21  0013 07/03/21  0634 06/30/21  0508 06/29/21  1837   SODIUM mmol/L 156* 156* 158* 159* 160*  --  135* 138   POTASSIUM mmol/L 3 3* 3 4* 3 5 3 9 3 6  --  4 5 3 5   CHLORIDE mmol/L 115* 116* 117* 122* 123*  --  101 103   CO2 mmol/L 37* 39* 36* 33* 33*  --  24 26   ANION GAP mmol/L 4 1* 5 4 4  --  10 9   BUN mg/dL 34* 33* 33* 25 24  --  21 17   CREATININE mg/dL 1 39* 1 30 1 38* 1 31* 1 26  --  1 97* 1 66*   EGFR ml/min/1 73sq m 59 64 59 63 66  --  38 47   CALCIUM mg/dL 8 6 8 1* 8 7 8 4 8 6  --  7 7* 7 8*   CALCIUM, IONIZED mmol/L  --   --   --   --   --   --  1 04* 1 12   MAGNESIUM mg/dL  --   --   --   --   --  2 2 2 7* 3 4*   PHOSPHORUS mg/dL  --   --   --   --   --   --  5 1* 5 0*     Results from last 7 days   Lab Units 07/06/21  0503 07/05/21  1308 07/01/21  0500 06/30/21  0508 06/29/21  1837   AST U/L 234* 170* 415* 483* 400*   ALT U/L 100* 109* 343* 222* 240*   ALK PHOS U/L 163* 141* 67 87 150*   TOTAL PROTEIN g/dL 6 0* 5 4* 5 1* 5 5* 5 9*   ALBUMIN g/dL 1 8* 1 6* 2 2* 2 6* 2 7*   TOTAL BILIRUBIN mg/dL 0 37 0 26 0 60 0 79 0 81   BILIRUBIN DIRECT mg/dL 0 13 0 10 0 24*  --   --      Results from last 7 days   Lab Units 07/06/21  1137 07/05/21  2343 07/05/21  1803 07/05/21  1154 07/05/21  0009 07/04/21  1823 07/04/21  1223 07/04/21  0630 06/30/21  1839 06/29/21  1349   POC GLUCOSE mg/dl 151* 158* 128 193* 121 159* 169* 205* 166* 203*     Results from last 7 days   Lab Units 07/06/21  0503 07/05/21  2350 07/05/21  1833 07/05/21  0513 07/05/21  0013 07/04/21  1627 07/04/21  0510 07/03/21  2348 07/03/21  1723 07/03/21  0813 07/02/21  2352 07/02/21  1520   GLUCOSE RANDOM mg/dL 174* 159* 129 137 125 147* 206* 211* 189* 169* 130 93          Results from last 7 days   Lab Units 07/05/21  1307 07/05/21  0949 07/01/21  0500   PH ART  7 353 7 274* 7 465*   PCO2 ART mm Hg 56 7* 70 1* 34 2*   PO2 ART mm Hg 125 0 85 2 139 7*   HCO3 ART mmol/L 30 8* 31 8* 24 1   BASE EXC ART mmol/L 4 3 3 5 0 7   O2 CONTENT ART mL/dL 14 2* 14 1* 16 7   O2 HGB, ARTERIAL % 97 7* 94 8 97 7*   ABG SOURCE  Line, Arterial Line, Arterial Line, Arterial                   Results from last 7 days   Lab Units 07/05/21  0513 07/04/21  0510 06/30/21  0508 06/29/21  1837   PROCALCITONIN ng/ml 17 19* 31 20* 38 44* 3 77*     Results from last 7 days   Lab Units 06/29/21  2122 06/29/21  1837 06/29/21  1659 06/29/21  1417   LACTIC ACID mmol/L 2 9* 4 4* 6 6* 23 0*             Results from last 7 days   Lab Units 06/29/21  1837   NT-PRO BNP pg/mL 22                         Results from last 7 days   Lab Units 07/05/21  1308 06/29/21  1535   CLARITY UA  Clear Bloody*   COLOR UA  Light Yellow Red*   SPEC GRAV UA  1 010 1 015   PH UA  5 5 7 0   GLUCOSE UA mg/dl Negative 100 (1/10%)*   KETONES UA mg/dl Negative 5 (Trace)*   BLOOD UA  Trace-Intact* 250 0*   PROTEIN UA mg/dl Negative >=500*   NITRITE UA  Negative Negative   BILIRUBIN UA  Negative Negative   UROBILINOGEN UA E U /dl 0 2 Negative   LEUKOCYTES UA  Negative Negative   WBC UA /hpf 0-1* 2-4   RBC UA /hpf 0-1* 10-20*   BACTERIA UA /hpf None Seen Innumerable*   EPITHELIAL CELLS WET PREP /hpf None Seen Occasional   MUCUS THREADS   --  Occasional*                 Medications:   Scheduled Medications:  hydrocortisone sodium succinate, 50 mg, Intravenous, Q12H ZULMA  IV  keppra    Q 12 hrs - d/c   7/6  IV  Flagyl  Q 8 hrs - d/c   7/6  IV protonix Q 12 hrs - d/c  7/6  IV lasix  BID  - d/c  7/6    Continuous IV Infusions:  dextrose, 100 mL/hr, Intravenous, Continuous      PRN Meds:  acetaminophen, 650 mg, Oral, Q4H PRN  glycerin-hypromellose-, 2 drop, Both Eyes, Q4H PRN  Labetalol HCl, 10 mg, Intravenous, Q6H PRN        Discharge Plan:    D    Network Utilization Review Department  ATTENTION: Please call with any questions or concerns to [phone] and carefully listen to the prompts so that you are directed to the right person  All voicemails are confidential   Kiara Andrade all requests for admission clinical reviews, approved or denied determinations and any other requests to dedicated fax number below belonging to the campus where the patient is receiving treatment   List of dedicated fax numbers for the Facilities:  [address] DENIALS (Administrative/Medical Necessity) [phone]   [address] (Maternity/NICU/Pediatrics) [phone]   [address] [place] [address] [address] [place] [address] [address] [facility] [place] [address] [phone]

## 2021-07-06 NOTE — ASSESSMENT & PLAN NOTE
· In light of increased WBC, and PCT  · Initiated GN coverage and anaerobic coverage with Cefepime and Flagyl D#6 (started 6/30)   · Chest xr (7/4/2021): Interval significant worsening of diffuse bilateral airspace opacities    · Procalcitonin (7/4): 31 2 --> 17 19    Plan:  · Continue with 7 day course of antibiotics   · Awaiting decision planning today given brain death status

## 2021-07-06 NOTE — ASSESSMENT & PLAN NOTE
· Myoclonus versus seizure activity but favor myoclonus on early presentation  · Loaded with keppra and continued on keppra during admission  · Brain death has been declared

## 2021-07-06 NOTE — ASSESSMENT & PLAN NOTE
· In light of increased WBC, and PCT  · Initiated GN coverage and anaerobic coverage with Cefepime and Flagyl D#6 (started 6/30)   · Chest xr (7/4/2021): Interval significant worsening of diffuse bilateral airspace opacities    · Procalcitonin (7/4): 31 2 --> 17 19    Plan:  · Started on cefepime and flagyl during admission  · Brain death has been declared

## 2021-07-06 NOTE — ASSESSMENT & PLAN NOTE
Monitoring urine output and electrolytes closely    Plan:   · DDAVP given overnight for increased urine output  · Brain death has been declared

## 2021-07-06 NOTE — ASSESSMENT & PLAN NOTE
Monitoring urine output and electrolytes closely    Plan:   · DDAVP given overnight for increased urine output
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
## 2021-07-07 NOTE — UTILIZATION REVIEW
Notification of Discharge   This is a Notification of Discharge from our facility [address] [place] [address]  Please be advised that this patient has been discharge from our facility  Below you will find the admission and discharge date and time including the patients disposition  UTILIZATION REVIEW CONTACT:  Daya Fowler  Utilization   Network Utilization Review Department  Phone: [phone] x carefully listen to the prompts  All voicemails are confidential   Email: [email or web address]     PHYSICIAN ADVISORY SERVICES:  FOR YSSY-[facility]-HDME REVIEW - MEDICAL NECESSITY DENIAL  Phone: [phone]  Fax: [phone]  Email: [email or web address]     PRESENTATION DATE: 2021  5:41 PM  OBERVATION ADMISSION DATE:   INPATIENT ADMISSION DATE: 21  5:41 PM   DISCHARGE DATE: 2021 10:58 PM  DISPOSITION:        IMPORTANT INFORMATION:  Send all requests for admission clinical reviews, approved or denied determinations and any other requests to dedicated fax number below belonging to the campus where the patient is receiving treatment   List of dedicated fax numbers:  [address] DENIALS (Administrative/Medical Necessity) [phone]   [address] (Maternity/NICU/Pediatrics) [phone]   [facility] [phone]   [facility] [phone]   [facility] [phone]   [facility] [address] [place] [address] [phone]   [facility]  [phone]   [address] [facility] [address]  [address] [facility] [address] [facility] [phone]

## 2021-07-07 NOTE — DEATH NOTE
INPATIENT DEATH NOTE  Stanislaw Jordan 49 y o  male MRN: [id number]  Unit/Bed#: ICU 12 Encounter: [id number]    Date, Time and Cause of Death    Date of Death: 21  Time of Death:  5:58 PM  Preliminary Cause of Death: Brain death  Entered by: Miguel Loya[KN1 [id number]]     Attribution     KN1 1 Shaheed Sullivan DO 21 17:27           Patient's Information  Pronounced by: Dr Qian Vargas  Did the patient's death occur in the ED?: No  Did the patient's death occur in the OR?: No  Did the patient's death occur less than 10 days post-op?: No  Did the patient's death occur within 24 hours of admission?: No  Was code status DNR at the time of death?: No    PHYSICAL EXAM:  Unresponsive to noxious stimuli, Spontaneous respirations absent and Breath sounds absent    Medical Examiner notification criteria:  Any type of trauma   Medical Examiner's office notified?:  Yes   Medical Examiner accepted case?:  Yes  Name of Medical Examiner:  On call     Family Notification  Was the family notified?: Yes  Date Notified: 21  Notified by: Shaheed Sullivan  Name of Family Notified of Death: Michael Gann   Relationship to Patient: Sister  Family Notification Route: Telephone  Was the family told to contact a  home?: No (pending discussion with [facility])    Autopsy Options:  Medical Examiner's Case    Primary Service Attending Physician notified?:  yes - Attending:  Asia Campoverde MD    Physician/Resident responsible for completing Discharge Summary:  Charly Denny

## 2021-10-03 NOTE — ASSESSMENT & PLAN NOTE
· Monitor neuro status closely  · Poor prognosis  · Anticipate need for [facility]  · Consider repeat CT scan at 24 hours post even or with any change in neuro exam show